# Patient Record
Sex: FEMALE | Race: WHITE
[De-identification: names, ages, dates, MRNs, and addresses within clinical notes are randomized per-mention and may not be internally consistent; named-entity substitution may affect disease eponyms.]

---

## 2017-01-19 ENCOUNTER — HOSPITAL ENCOUNTER (OUTPATIENT)
Dept: HOSPITAL 18 - NAVSJIPCSP | Age: 82
Discharge: HOME | End: 2017-01-19
Attending: INTERNAL MEDICINE
Payer: MEDICARE

## 2017-01-19 DIAGNOSIS — Z79.899: ICD-10-CM

## 2017-01-19 DIAGNOSIS — E78.5: Primary | ICD-10-CM

## 2017-01-19 DIAGNOSIS — I11.9: ICD-10-CM

## 2017-01-19 LAB
ALP SERPL-CCNC: 89 U/L (ref 40–150)
ALT SERPL W P-5'-P-CCNC: 26 U/L (ref 0–55)
ANION GAP SERPL CALC-SCNC: 12 MMOL/L (ref 10–20)
AST SERPL-CCNC: 19 U/L (ref 5–34)
BACTERIA UR QL AUTO: (no result) HPF
BASOPHILS # BLD AUTO: 0.1 THOU/UL (ref 0–0.2)
BASOPHILS NFR BLD AUTO: 1.2 % (ref 0–1)
BILIRUB SERPL-MCNC: 0.8 MG/DL (ref 0.2–1.2)
BUN SERPL-MCNC: 13 MG/DL (ref 9.8–20.1)
CALCIUM SERPL-MCNC: 9.2 MG/DL (ref 7.8–10.44)
CHLORIDE SERPL-SCNC: 107 MMOL/L (ref 98–107)
CO2 SERPL-SCNC: 26 MMOL/L (ref 23–31)
CREAT CL PREDICTED SERPL C-G-VRATE: 0 ML/MIN (ref 70–130)
EOSINOPHIL # BLD AUTO: 0.5 THOU/UL (ref 0–0.7)
EOSINOPHIL NFR BLD AUTO: 5.4 % (ref 0–10)
GLOBULIN SER CALC-MCNC: 2.4 G/DL (ref 2.4–3.5)
HCT VFR BLD CALC: 45.1 % (ref 36–47)
LDLC SERPL CALC-MCNC: 57 MG/DL
LYMPHOCYTES # BLD AUTO: 3.9 THOU/UL (ref 1.2–3.4)
LYMPHOCYTES NFR BLD AUTO: 42 % (ref 21–51)
MONOCYTES # BLD AUTO: 0.5 THOU/UL (ref 0.11–0.59)
MONOCYTES NFR BLD AUTO: 5.2 % (ref 0–10)
NEUTROPHILS # BLD AUTO: 4.3 THOU/UL (ref 1.4–6.5)
RBC # BLD AUTO: 4.94 MILL/UL (ref 4.2–5.4)
WBC # BLD AUTO: 9.3 THOU/UL (ref 4.8–10.8)
WBC UR QL AUTO: (no result) HPF (ref 0–3)

## 2017-01-19 PROCEDURE — 81003 URINALYSIS AUTO W/O SCOPE: CPT

## 2017-01-19 PROCEDURE — 80053 COMPREHEN METABOLIC PANEL: CPT

## 2017-01-19 PROCEDURE — 81015 MICROSCOPIC EXAM OF URINE: CPT

## 2017-01-19 PROCEDURE — 80061 LIPID PANEL: CPT

## 2017-01-19 PROCEDURE — 85025 COMPLETE CBC W/AUTO DIFF WBC: CPT

## 2017-01-19 PROCEDURE — 84443 ASSAY THYROID STIM HORMONE: CPT

## 2017-01-19 PROCEDURE — 36415 COLL VENOUS BLD VENIPUNCTURE: CPT

## 2017-04-20 ENCOUNTER — HOSPITAL ENCOUNTER (OUTPATIENT)
Dept: HOSPITAL 18 - NAVSJIPCSP | Age: 82
Discharge: HOME | End: 2017-04-20
Attending: INTERNAL MEDICINE
Payer: MEDICARE

## 2017-04-20 DIAGNOSIS — E78.5: Primary | ICD-10-CM

## 2017-04-20 LAB
CHD RISK SERPL-RTO: 2.7 (ref ?–4.5)
CHOLEST SERPL-MCNC: 156 MG/DL
HDLC SERPL-MCNC: 58 MG/DL
LDLC SERPL CALC-MCNC: 86 MG/DL
TRIGL SERPL-MCNC: 58 MG/DL (ref ?–150)

## 2017-04-20 PROCEDURE — 80061 LIPID PANEL: CPT

## 2017-04-20 PROCEDURE — 36415 COLL VENOUS BLD VENIPUNCTURE: CPT

## 2017-07-22 ENCOUNTER — HOSPITAL ENCOUNTER (EMERGENCY)
Dept: HOSPITAL 18 - NAV ERS | Age: 82
Discharge: HOME | End: 2017-07-22
Payer: MEDICARE
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
## 2017-07-22 DIAGNOSIS — F17.210: ICD-10-CM

## 2017-07-22 DIAGNOSIS — Z79.899: ICD-10-CM

## 2017-07-22 DIAGNOSIS — J44.9: ICD-10-CM

## 2017-07-22 DIAGNOSIS — Z79.82: ICD-10-CM

## 2017-07-22 DIAGNOSIS — I10: ICD-10-CM

## 2017-07-22 DIAGNOSIS — M54.5: Primary | ICD-10-CM

## 2017-07-22 PROCEDURE — 72100 X-RAY EXAM L-S SPINE 2/3 VWS: CPT

## 2017-07-22 NOTE — RAD
3 VIEWS LUMBOSACRAL SPINE:

 

Date:  07/22/17 

 

COMPARISON:  

None. 

 

HISTORY:  

Fall this morning with right lower back pain. 

 

FINDINGS:

Three views of the lumbosacral spine show slight wedge compression deformity of the L2 vertebral bod
y with approximately 10% height loss. This may be acute or chronic. Vertebral bodies demonstrate nor
mal alignment without subluxation. Small osteophytes are seen throughout the lumbar spine. 

 

Atherosclerotic calcifications are seen in the aorta. Surgical clips are seen in the right upper queta
drant, likely from cholecystectomy. 

 

IMPRESSION: 

1.  Mild degenerative changes of the lumbar spine. 

 

2.  There appears to be slight compression deformity of the L2 vertebral body. This may be an acute 
or chronic compression fracture. 

 

 

POS: [facility]

## 2017-07-26 ENCOUNTER — HOSPITAL ENCOUNTER (OUTPATIENT)
Dept: HOSPITAL 18 - NAVSJIPCSP | Age: 82
End: 2017-07-26
Attending: INTERNAL MEDICINE
Payer: MEDICARE

## 2017-07-26 DIAGNOSIS — F32.4: ICD-10-CM

## 2017-07-26 DIAGNOSIS — Z79.899: ICD-10-CM

## 2017-07-26 DIAGNOSIS — E78.5: Primary | ICD-10-CM

## 2017-07-26 LAB
CHD RISK SERPL-RTO: 2.1 (ref ?–4.5)
CHOLEST SERPL-MCNC: 137 MG/DL
HDLC SERPL-MCNC: 66 MG/DL
LDLC SERPL CALC-MCNC: 60 MG/DL
TRIGL SERPL-MCNC: 55 MG/DL (ref ?–150)

## 2017-07-26 PROCEDURE — 80061 LIPID PANEL: CPT

## 2017-07-26 PROCEDURE — 36415 COLL VENOUS BLD VENIPUNCTURE: CPT

## 2017-08-06 ENCOUNTER — HOSPITAL ENCOUNTER (EMERGENCY)
Dept: HOSPITAL 18 - NAV ERS | Age: 82
Discharge: HOME | End: 2017-08-06
Payer: MEDICARE

## 2017-08-06 DIAGNOSIS — Z79.899: ICD-10-CM

## 2017-08-06 DIAGNOSIS — M25.562: Primary | ICD-10-CM

## 2017-08-06 DIAGNOSIS — F17.210: ICD-10-CM

## 2017-08-06 DIAGNOSIS — J44.9: ICD-10-CM

## 2017-08-06 DIAGNOSIS — Z79.82: ICD-10-CM

## 2017-08-06 DIAGNOSIS — I10: ICD-10-CM

## 2017-08-06 NOTE — RAD
LEFT KNEE 4 VIEWS:

 

Date:  08/06/17 

 

HISTORY:  

Left knee pain. 

 

FINDINGS/IMPRESSION: 

Mild degenerative changes are present. No fracture, dislocation, or bony destruction identified. 

 

 

POS: [facility]

## 2017-10-25 ENCOUNTER — HOSPITAL ENCOUNTER (OUTPATIENT)
Dept: HOSPITAL 92 - LABBT | Age: 82
Discharge: HOME | End: 2017-10-25
Attending: ORTHOPAEDIC SURGERY
Payer: MEDICARE

## 2017-10-25 DIAGNOSIS — G56.01: ICD-10-CM

## 2017-10-25 DIAGNOSIS — Z01.818: Primary | ICD-10-CM

## 2017-10-25 LAB
ANION GAP SERPL CALC-SCNC: 12 MMOL/L (ref 10–20)
BASOPHILS # BLD AUTO: 0.1 THOU/UL (ref 0–0.2)
BASOPHILS NFR BLD AUTO: 0.7 % (ref 0–1)
BUN SERPL-MCNC: 13 MG/DL (ref 9.8–20.1)
CALCIUM SERPL-MCNC: 9.5 MG/DL (ref 7.8–10.44)
CHLORIDE SERPL-SCNC: 105 MMOL/L (ref 98–107)
CO2 SERPL-SCNC: 27 MMOL/L (ref 23–31)
CREAT CL PREDICTED SERPL C-G-VRATE: 0 ML/MIN (ref 70–130)
EOSINOPHIL # BLD AUTO: 0.3 THOU/UL (ref 0–0.7)
EOSINOPHIL NFR BLD AUTO: 3.4 % (ref 0–10)
HCT VFR BLD CALC: 46.9 % (ref 36–47)
HYALINE CASTS #/AREA URNS LPF: (no result) LPF
LYMPHOCYTES # BLD: 2.1 THOU/UL (ref 1.2–3.4)
LYMPHOCYTES NFR BLD AUTO: 22 % (ref 21–51)
MONOCYTES # BLD AUTO: 0.5 THOU/UL (ref 0.11–0.59)
MONOCYTES NFR BLD AUTO: 5.5 % (ref 0–10)
NEUTROPHILS # BLD AUTO: 6.5 THOU/UL (ref 1.4–6.5)
RBC # BLD AUTO: 5.11 MILL/UL (ref 4.2–5.4)
RBC UR QL AUTO: (no result) HPF (ref 0–3)
WBC # BLD AUTO: 9.5 THOU/UL (ref 4.8–10.8)
WBC UR QL AUTO: (no result) HPF (ref 0–3)

## 2017-10-25 PROCEDURE — 93005 ELECTROCARDIOGRAM TRACING: CPT

## 2017-10-25 PROCEDURE — 93010 ELECTROCARDIOGRAM REPORT: CPT

## 2017-10-25 PROCEDURE — 85025 COMPLETE CBC W/AUTO DIFF WBC: CPT

## 2017-10-25 PROCEDURE — 81001 URINALYSIS AUTO W/SCOPE: CPT

## 2017-10-25 PROCEDURE — 80048 BASIC METABOLIC PNL TOTAL CA: CPT

## 2017-10-25 NOTE — EKG
Test Reason : 

Blood Pressure : ***/*** mmHG

Vent. Rate : 064 BPM     Atrial Rate : 064 BPM

   P-R Int : 114 ms          QRS Dur : 080 ms

    QT Int : 426 ms       P-R-T Axes : 032 070 057 degrees

   QTc Int : 439 ms

 

Normal sinus rhythm

Anterior infarct , age undetermined

Abnormal ECG

No previous ECGs available

Confirmed by DR. DICK ABRAHAM (3) on 10/25/2017 1:26:52 PM

 

Referred By:  FLACA           Confirmed By:DR. DICK ABRAHAM

## 2017-10-27 ENCOUNTER — HOSPITAL ENCOUNTER (OUTPATIENT)
Dept: HOSPITAL 92 - SDC | Age: 82
Discharge: HOME | End: 2017-10-27
Attending: ANESTHESIOLOGY
Payer: MEDICARE

## 2017-10-27 VITALS — BODY MASS INDEX: 21.7 KG/M2

## 2017-10-27 DIAGNOSIS — E78.5: ICD-10-CM

## 2017-10-27 DIAGNOSIS — J44.9: ICD-10-CM

## 2017-10-27 DIAGNOSIS — I50.9: ICD-10-CM

## 2017-10-27 DIAGNOSIS — I11.0: ICD-10-CM

## 2017-10-27 DIAGNOSIS — G56.01: Primary | ICD-10-CM

## 2017-10-27 DIAGNOSIS — Z87.891: ICD-10-CM

## 2017-10-27 DIAGNOSIS — E03.9: ICD-10-CM

## 2017-10-27 PROCEDURE — 01N50ZZ RELEASE MEDIAN NERVE, OPEN APPROACH: ICD-10-PCS | Performed by: ORTHOPAEDIC SURGERY

## 2017-10-27 PROCEDURE — 96374 THER/PROPH/DIAG INJ IV PUSH: CPT

## 2017-10-27 NOTE — OP
DATE OF PROCEDURE:  10/27/2017

 

PREOPERATIVE DIAGNOSIS:  Right carpal tunnel syndrome.

 

POSTOPERATIVE DIAGNOSIS:  Right carpal tunnel syndrome.  t

 

FINDINGS:  

1.  Tight transverse carpal ligament in the central proximal one-third with some early flattening, b
ut no true hourglass formation.  

2.  No marked tenosynovitis seen.

3.  Type 1 takeoff motor branch.

 

PROCEDURE PERFORMED:  

1.  Carpal tunnel release, right median nerve neuroplasty of right wrist.  

2.  Application, intraoperative steroids. 

 

COMPLICATIONS:  None.

 

TOURNIQUET TIME:  14 minutes at 250 mmHg pressure.

 

ANESTHESIA:  [employer] Anesthesia, general LMA technique augmented by 8 mL 0.5% Marcaine block incisi
on.  She has failed conservative treatment and has had the problem documented in the evaluation as w
ell as elective diagnostically.

 

DESCRIPTION OF PROCEDURE:  After successful anesthesia listed above, the limb was prepped and draped
.  The patient had timeout done appropriately.  The limb was exsanguinated, tourniquet inflated to 2
50 mmHg pressure.  We then gave the 8 mL Marcaine block along the incision outlined beginning in pedro
e with the ring finger, central area from medial, lateral, and as far distal as _____ 4 proximal and
 6 mm distal to volar wrist flexion crease.

 

We then carried this incision to skin and subcutaneous tissue, staying just slightly ulnar to the li
ne of the palmaris longus until we reached the transverse carpal ligament.  Blunt dissection exposed
, a Weitlaner retractor was placed and then we began to _____ the transcarpal ligament from the mid 
portion distally visualizing and avoiding the distal neurovascular bundle that included the division
 of the nerves.  Here we saw the motor branch and it was intact without separate constriction.

 

We then under direct visualization, released the transcarpal ligament from the mid portion proximall
y.  Then, this was 100% released all the way to the volar flexion, the volar wrist fascia as well.  
The tendons were inspected, there was no excessive tenosynovitis, we then visualized the nerve and f
ound the flattening as described above.

 

Tourniquet released, hemostasis obtained.  Two mL of Celestone drip along the nerve and then wound w
as closed with interrupted 4-0 nylon in a mattress pattern.  Bulky dressing was applied.  The patien
t left operating room without anesthetic or operative complication.

## 2018-05-21 ENCOUNTER — APPOINTMENT (RX ONLY)
Dept: URBAN - METROPOLITAN AREA CLINIC 91 | Facility: CLINIC | Age: 83
Setting detail: DERMATOLOGY
End: 2018-05-21

## 2018-05-21 PROBLEM — I10 ESSENTIAL (PRIMARY) HYPERTENSION: Status: ACTIVE | Noted: 2018-05-21

## 2018-05-21 PROBLEM — J44.9 CHRONIC OBSTRUCTIVE PULMONARY DISEASE, UNSPECIFIED: Status: ACTIVE | Noted: 2018-05-21

## 2018-05-21 PROBLEM — C44.629 SQUAMOUS CELL CARCINOMA OF SKIN OF LEFT UPPER LIMB, INCLUDING SHOULDER: Status: ACTIVE | Noted: 2018-05-21

## 2018-05-21 PROBLEM — L23.7 ALLERGIC CONTACT DERMATITIS DUE TO PLANTS, EXCEPT FOOD: Status: ACTIVE | Noted: 2018-05-21

## 2018-05-21 PROBLEM — E03.9 HYPOTHYROIDISM, UNSPECIFIED: Status: ACTIVE | Noted: 2018-05-21

## 2018-05-21 PROCEDURE — ? COUNSELING

## 2018-05-21 PROCEDURE — ? REFERRAL

## 2018-05-21 PROCEDURE — 99213 OFFICE O/P EST LOW 20 MIN: CPT

## 2018-06-08 ENCOUNTER — RX ONLY (OUTPATIENT)
Age: 83
Setting detail: RX ONLY
End: 2018-06-08

## 2018-06-08 ENCOUNTER — APPOINTMENT (RX ONLY)
Dept: URBAN - METROPOLITAN AREA CLINIC 91 | Facility: CLINIC | Age: 83
Setting detail: DERMATOLOGY
End: 2018-06-08

## 2018-06-08 VITALS
SYSTOLIC BLOOD PRESSURE: 144 MMHG | HEIGHT: 63 IN | HEART RATE: 72 BPM | WEIGHT: 127 LBS | DIASTOLIC BLOOD PRESSURE: 67 MMHG

## 2018-06-08 DIAGNOSIS — D485 NEOPLASM OF UNCERTAIN BEHAVIOR OF SKIN: ICD-10-CM

## 2018-06-08 PROBLEM — D48.5 NEOPLASM OF UNCERTAIN BEHAVIOR OF SKIN: Status: ACTIVE | Noted: 2018-06-08

## 2018-06-08 PROBLEM — C44.629 SQUAMOUS CELL CARCINOMA OF SKIN OF LEFT UPPER LIMB, INCLUDING SHOULDER: Status: ACTIVE | Noted: 2018-06-08

## 2018-06-08 PROCEDURE — 99213 OFFICE O/P EST LOW 20 MIN: CPT | Mod: 25

## 2018-06-08 PROCEDURE — 11100: CPT | Mod: 59

## 2018-06-08 PROCEDURE — ? BIOPSY BY SHAVE METHOD

## 2018-06-08 PROCEDURE — ? CONSULTATION FOR MOHS SURGERY

## 2018-06-08 PROCEDURE — ? MOHS SURGERY

## 2018-06-08 PROCEDURE — 17314 MOHS ADDL STAGE T/A/L: CPT

## 2018-06-08 PROCEDURE — 17313 MOHS 1 STAGE T/A/L: CPT

## 2018-06-08 PROCEDURE — 13121 CMPLX RPR S/A/L 2.6-7.5 CM: CPT

## 2018-06-08 RX ORDER — MUPIROCIN 20 MG/G
OINTMENT TOPICAL
Qty: 1 | Refills: 3 | Status: ERX

## 2018-06-08 RX ORDER — MUPIROCIN 20 MG/G
APPLY OINTMENT TOPICAL
Qty: 1 | Refills: 3 | Status: ERX | COMMUNITY
Start: 2018-06-08

## 2018-06-08 ASSESSMENT — LOCATION SIMPLE DESCRIPTION DERM: LOCATION SIMPLE: LEFT FOREARM

## 2018-06-08 ASSESSMENT — LOCATION DETAILED DESCRIPTION DERM: LOCATION DETAILED: LEFT DISTAL DORSAL FOREARM

## 2018-06-08 ASSESSMENT — LOCATION ZONE DERM: LOCATION ZONE: ARM

## 2018-06-08 NOTE — PROCEDURE: BIOPSY BY SHAVE METHOD
Bill For Surgical Tray: no
Was A Bandage Applied: Yes
Hemostasis: Electrocautery
Lab Facility: [id number]
Wound Care: No ointment
Consent: Written consent was obtained and risks were reviewed including but not limited to scarring, infection, bleeding, scabbing, incomplete removal, nerve damage and allergy to anesthesia.
X Size Of Lesion In Cm: 2
Curettage Text: The wound bed was treated with curettage after the biopsy was performed.
Billing Type: Third-Party Bill
Anesthesia Type: 1% lidocaine with epinephrine
Anesthesia Volume In Cc (Will Not Render If 0): 6
Dressing: no dressing applied
Additional Anesthesia Volume In Cc (Will Not Render If 0): 0
Cryotherapy Text: The wound bed was treated with cryotherapy after the biopsy was performed.
Electrodesiccation Text: The wound bed was treated with electrodesiccation after the biopsy was performed.
Type Of Destruction Used: Curettage
Post-Care Instructions: I reviewed with the patient in detail post-care instructions. Patient is to keep the biopsy site dry overnight, and then apply bacitracin twice daily until healed. Patient may apply hydrogen peroxide soaks to remove any crusting.
Detail Level: Detailed
Electrodesiccation And Curettage Text: The wound bed was treated with electrodesiccation and curettage after the biopsy was performed.
Biopsy Type: H and E
Lab: [id number]
Notification Instructions: Patient will be notified of biopsy results.
Biopsy Method: Dermablade
Silver Nitrate Text: The wound bed was treated with silver nitrate after the biopsy was performed.

## 2018-06-08 NOTE — PROCEDURE: CONSULTATION FOR MOHS SURGERY
Body Location Override (Optional - Billing Will Still Be Based On Selected Body Map Location If Applicable): left distal forearm
Location Indication Override (Is Already Calculated Based On Selected Body Location): Area L
Detail Level: Detailed
Name Of The Referring Provider For Procedure: SIVA Manuel
Size Of Lesion: -
Incorporate Mauc In Note: Yes
X Size Of Lesion In Cm (Optional): 0

## 2018-06-08 NOTE — HPI: MOHS SURGERY CONSULTATION
Has The Cancer Been Biopsied Before?: has not been previously biopsied
Who Is Your Referring Provider?: SIVA Manuel
Body Location Override (Optional): Left distal forearm

## 2018-06-08 NOTE — PROCEDURE: MOHS SURGERY
Rotation Flap Text: The defect edges were debeveled with a #15 scalpel blade.  Given the location of the defect, shape of the defect and the proximity to free margins a rotation flap was deemed most appropriate.  Using a sterile surgical marker, an appropriate rotation flap was drawn incorporating the defect and placing the expected incisions within the relaxed skin tension lines where possible.    The area thus outlined was incised deep to adipose tissue with a #15 scalpel blade.  The skin margins were undermined to an appropriate distance in all directions utilizing iris scissors.
Stage 5: Additional Anesthesia Volume In Cc: 0
Transposition Flap Text: The defect edges were debeveled with a #15 scalpel blade.  Given the location of the defect and the proximity to free margins a transposition flap was deemed most appropriate.  Using a sterile surgical marker, an appropriate transposition flap was drawn incorporating the defect.    The area thus outlined was incised deep to adipose tissue with a #15 scalpel blade.  The skin margins were undermined to an appropriate distance in all directions utilizing iris scissors.
Consent (Spinal Accessory)/Introductory Paragraph: The rationale for Mohs was explained to the patient and consent was obtained. The risks, benefits and alternatives to therapy were discussed in detail. Specifically, the risks of damage to the spinal accessory nerve, infection, scarring, bleeding, prolonged wound healing, incomplete removal, allergy to anesthesia, and recurrence were addressed. Prior to the procedure, the treatment site was clearly identified and confirmed by the patient. All components of Universal Protocol/PAUSE Rule completed.
O-T Plasty Text: The defect edges were debeveled with a #15 scalpel blade.  Given the location of the defect, shape of the defect and the proximity to free margins an O-T plasty was deemed most appropriate.  Using a sterile surgical marker, an appropriate O-T plasty was drawn incorporating the defect and placing the expected incisions within the relaxed skin tension lines where possible.    The area thus outlined was incised deep to adipose tissue with a #15 scalpel blade.  The skin margins were undermined to an appropriate distance in all directions utilizing iris scissors.
Number Of Stages: 2
Interpolation Flap Text: A decision was made to reconstruct the defect utilizing an interpolation axial flap and a staged reconstruction.  A telfa template was made of the defect.  This telfa template was then used to outline the interpolation flap.  The donor area for the pedicle flap was then injected with anesthesia.  The flap was excised through the skin and subcutaneous tissue down to the layer of the underlying musculature.  The interpolation flap was carefully excised within this deep plane to maintain its blood supply.  The edges of the donor site were undermined.   The donor site was closed in a primary fashion.  The pedicle was then rotated into position and sutured.  Once the tube was sutured into place, adequate blood supply was confirmed with blanching and refill.  The pedicle was then wrapped with xeroform gauze and dressed appropriately with a telfa and gauze bandage to ensure continued blood supply and protect the attached pedicle.
Localized Dermabrasion Text: The patient was draped in routine manner.  Localized dermabrasion using 3 x 17 mm wire brush was performed in routine manner to papillary dermis. This spot dermabrasion is being performed to complete skin cancer reconstruction. It also will eliminate the other sun damaged precancerous cells that are known to be part of the regional effect of a lifetime's worth of sun exposure. This localized dermabrasion is therapeutic and should not be considered cosmetic in any regard.
Closure 2 Information: This tab is for additional flaps and grafts, including complex repair and grafts and complex repair and flaps. You can also specify a different location for the additional defect, if the location is the same you do not need to select a new one. We will insert the automated text for the repair you select below just as we do for solitary flaps and grafts. Please note that at this time if you select a location with a different insurance zone you will need to override the ICD10 and CPT if appropriate.
Wound Care: Polysporin ointment
W Plasty Text: The lesion was extirpated to the level of the fat with a #15 scalpel blade.  Given the location of the defect, shape of the defect and the proximity to free margins a W-plasty was deemed most appropriate for repair.  Using a sterile surgical marker, the appropriate transposition arms of the W-plasty were drawn incorporating the defect and placing the expected incisions within the relaxed skin tension lines where possible.    The area thus outlined was incised deep to adipose tissue with a #15 scalpel blade.  The skin margins were undermined to an appropriate distance in all directions utilizing iris scissors.  The opposing transposition arms were then transposed into place in opposite direction and anchored with interrupted buried subcutaneous sutures.
Use Separate Skin Prep For Stages And Repair: Yes
Lazy S Complex Repair Preamble Text (Leave Blank If You Do Not Want): Extensive wide undermining was performed.
Posterior Auricular Interpolation Flap Text: A decision was made to reconstruct the defect utilizing an interpolation axial flap and a staged reconstruction.  A telfa template was made of the defect.  This telfa template was then used to outline the posterior auricular interpolation flap.  The donor area for the pedicle flap was then injected with anesthesia.  The flap was excised through the skin and subcutaneous tissue down to the layer of the underlying musculature.  The pedicle flap was carefully excised within this deep plane to maintain its blood supply.  The edges of the donor site were undermined.   The donor site was closed in a primary fashion.  The pedicle was then rotated into position and sutured.  Once the tube was sutured into place, adequate blood supply was confirmed with blanching and refill.  The pedicle was then wrapped with xeroform gauze and dressed appropriately with a telfa and gauze bandage to ensure continued blood supply and protect the attached pedicle.
Subsequent Stages Histo Method Verbiage: Using a similar technique to that described above, a thin layer of tissue was removed from all areas where tumor was visible on the previous stage.  The tissue was again oriented, mapped, dyed, and processed as above.
Hemostasis: Electrocautery
Quadrant Reporting?: no
V-Y Flap Text: The defect edges were debeveled with a #15 scalpel blade.  Given the location of the defect, shape of the defect and the proximity to free margins a V-Y flap was deemed most appropriate.  Using a sterile surgical marker, an appropriate advancement flap was drawn incorporating the defect and placing the expected incisions within the relaxed skin tension lines where possible.    The area thus outlined was incised deep to adipose tissue with a #15 scalpel blade.  The skin margins were undermined to an appropriate distance in all directions utilizing iris scissors.
Crescentic Intermediate Repair Preamble Text (Leave Blank If You Do Not Want): Undermining was performed with blunt dissection.
Referred To Mid-Level For Closure Text (Leave Blank If You Do Not Want): After obtaining clear surgical margins the patient was sent to a mid-level provider for surgical repair.  The patient understands they will receive post-surgical care and follow-up from the mid-level provider.
Cheiloplasty (Less Than 50%) Text: A decision was made to reconstruct the defect with a  cheiloplasty.  The defect was undermined extensively.  Additional obicularis oris muscle was excised with a 15 blade scalpel.  The defect was converted into a full thickness wedge, of less than 50% of the vertical height of the lip, to facilite a better cosmetic result.  Small vessels were then tied off with 5-0 monocyrl. The obicularis oris, superficial fascia, adipose and dermis were then reapproximated.  After the deeper layers were approximated the epidermis was reapproximated with particular care given to realign the vermilion border.
Burow's Advancement Flap Text: The defect edges were debeveled with a #15 scalpel blade.  Given the location of the defect and the proximity to free margins a Burow's advancement flap was deemed most appropriate.  Using a sterile surgical marker, the appropriate advancement flap was drawn incorporating the defect and placing the expected incisions within the relaxed skin tension lines where possible.    The area thus outlined was incised deep to adipose tissue with a #15 scalpel blade.  The skin margins were undermined to an appropriate distance in all directions utilizing iris scissors.
Modified Advancement Flap Text: The defect edges were debeveled with a #15 scalpel blade.  Given the location of the defect, shape of the defect and the proximity to free margins a modified advancement flap was deemed most appropriate.  Using a sterile surgical marker, an appropriate advancement flap was drawn incorporating the defect and placing the expected incisions within the relaxed skin tension lines where possible.    The area thus outlined was incised deep to adipose tissue with a #15 scalpel blade.  The skin margins were undermined to an appropriate distance in all directions utilizing iris scissors.
Island Pedicle Flap With Canthal Suspension Text: The defect edges were debeveled with a #15 scalpel blade.  Given the location of the defect, shape of the defect and the proximity to free margins an island pedicle advancement flap was deemed most appropriate.  Using a sterile surgical marker, an appropriate advancement flap was drawn incorporating the defect, outlining the appropriate donor tissue and placing the expected incisions within the relaxed skin tension lines where possible. The area thus outlined was incised deep to adipose tissue with a #15 scalpel blade.  The skin margins were undermined to an appropriate distance in all directions around the primary defect and laterally outward around the island pedicle utilizing iris scissors.  There was minimal undermining beneath the pedicle flap. A suspension suture was placed in the canthal tendon to prevent tension and prevent ectropion.
Simple / Intermediate / Complex Repair - Final Wound Length In Cm: 5
Stage 7: Additional Anesthesia Type: 1% lidocaine with epinephrine
Dermal Autograft Text: The defect edges were debeveled with a #15 scalpel blade.  Given the location of the defect, shape of the defect and the proximity to free margins a dermal autograft was deemed most appropriate.  Using a sterile surgical marker, the primary defect shape was transferred to the donor site. The area thus outlined was incised deep to adipose tissue with a #15 scalpel blade.  The harvested graft was then trimmed of adipose and epidermal tissue until only dermis was left.  The skin graft was then placed in the primary defect and oriented appropriately.
Epidermal Closure Graft Donor Site (Optional): simple interrupted
Date Of Previous Biopsy (Optional): 06/08/2018
Bilobed Flap Text: The defect edges were debeveled with a #15 scalpel blade.  Given the location of the defect and the proximity to free margins a bilobe flap was deemed most appropriate.  Using a sterile surgical marker, an appropriate bilobe flap drawn around the defect.    The area thus outlined was incised deep to adipose tissue with a #15 scalpel blade.  The skin margins were undermined to an appropriate distance in all directions utilizing iris scissors.
Dressing (No Sutures): dry sterile dressing
Referring Physician (Optional): SIVA Manuel
Medical Necessity Statement: Based on my medical judgement, Mohs surgery is the most appropriate treatment for this cancer compared to other treatments.
Consent (Scalp)/Introductory Paragraph: The rationale for Mohs was explained to the patient and consent was obtained. The risks, benefits and alternatives to therapy were discussed in detail. Specifically, the risks of changes in hair growth pattern secondary to repair, infection, scarring, bleeding, prolonged wound healing, incomplete removal, allergy to anesthesia, nerve injury and recurrence were addressed. Prior to the procedure, the treatment site was clearly identified and confirmed by the patient. All components of Universal Protocol/PAUSE Rule completed.
Bcc Infiltrative Histology Text: There were numerous aggregates of basaloid cells demonstrating an infiltrative pattern.
Post-Care Instructions: I reviewed with the patient in detail post-care instructions. Patient is not to engage in any heavy lifting, exercise, or swimming for the next 14 days. Should the patient develop any fevers, chills, bleeding, severe pain patient will contact the office immediately.
Secondary Intention Text (Leave Blank If You Do Not Want): The defect will heal with secondary intention.
Estimated Blood Loss (Cc): minimal
Manual Repair Warning Statement: We plan on removing the manually selected variable below in favor of our much easier automatic structured text blocks found in the previous tab. We decided to do this to help make the flow better and give you the full power of structured data. Manual selection is never going to be ideal in our platform and I would encourage you to avoid using manual selection from this point on, especially since I will be sunsetting this feature. It is important that you do one of two things with the customized text below. First, you can save all of the text in a word file so you can have it for future reference. Second, transfer the text to the appropriate area in the Library tab. Lastly, if there is a flap or graft type which we do not have you need to let us know right away so I can add it in before the variable is hidden. No need to panic, we plan to give you roughly 6 months to make the change.
Detail Level: Detailed
Helical Rim Advancement Flap Text: The defect edges were debeveled with a #15 blade scalpel.  Given the location of the defect and the proximity to free margins (helical rim) a double helical rim advancement flap was deemed most appropriate.  Using a sterile surgical marker, the appropriate advancement flaps were drawn incorporating the defect and placing the expected incisions between the helical rim and antihelix where possible.  The area thus outlined was incised through and through with a #15 scalpel blade.  With a skin hook and iris scissors, the flaps were gently and sharply undermined and freed up.
Advancement Flap (Single) Text: The defect edges were debeveled with a #15 scalpel blade.  Given the location of the defect and the proximity to free margins a single advancement flap was deemed most appropriate.  Using a sterile surgical marker, an appropriate advancement flap was drawn incorporating the defect and placing the expected incisions within the relaxed skin tension lines where possible.    The area thus outlined was incised deep to adipose tissue with a #15 scalpel blade.  The skin margins were undermined to an appropriate distance in all directions utilizing iris scissors.
Graft Donor Site Bandage (Optional-Leave Blank If You Don't Want In Note): Steri-strips and a pressure bandage were applied to the donor site.
Area L Indication Text: Tumors in this location are included in Area L (trunk and extremities).  Mohs surgery is indicated for larger tumors, or tumors with aggressive histologic features, in these anatomic locations.
Cheek Interpolation Flap Text: A decision was made to reconstruct the defect utilizing an interpolation axial flap and a staged reconstruction.  A telfa template was made of the defect.  This telfa template was then used to outline the Cheek Interpolation flap.  The donor area for the pedicle flap was then injected with anesthesia.  The flap was excised through the skin and subcutaneous tissue down to the layer of the underlying musculature.  The interpolation flap was carefully excised within this deep plane to maintain its blood supply.  The edges of the donor site were undermined.   The donor site was closed in a primary fashion.  The pedicle was then rotated into position and sutured.  Once the tube was sutured into place, adequate blood supply was confirmed with blanching and refill.  The pedicle was then wrapped with xeroform gauze and dressed appropriately with a telfa and gauze bandage to ensure continued blood supply and protect the attached pedicle.
Location Indication Override (Is Already Calculated Based On Selected Body Location): Area L
Ftsg Text: The defect edges were debeveled with a #15 scalpel blade.  Given the location of the defect, shape of the defect and the proximity to free margins a full thickness skin graft was deemed most appropriate.  Using a sterile surgical marker, the primary defect shape was transferred to the donor site. The area thus outlined was incised deep to adipose tissue with a #15 scalpel blade.  The harvested graft was then trimmed of adipose tissue until only dermis and epidermis was left.  The skin margins of the secondary defect were undermined to an appropriate distance in all directions utilizing iris scissors.  The secondary defect was closed with interrupted buried subcutaneous sutures.  The skin edges were then re-apposed with running  sutures.  The skin graft was then placed in the primary defect and oriented appropriately.
Partial Purse String (Intermediate) Text: Given the location of the defect and the characteristics of the surrounding skin an intermediate purse string closure was deemed most appropriate.  Undermining was performed circumfirentially around the surgical defect.  A purse string suture was then placed and tightened. Wound tension only allowed a partial closure of the circular defect.
No Residual Tumor Seen Histology Text: There were no malignant cells seen in the sections examined.
Repair Anesthesia Method: local infiltration
Repair Performed By Another Provider Text (Leave Blank If You Do Not Want): After obtaining clear surgical margins the defect was repaired by another provider.
Trilobed Flap Text: The defect edges were debeveled with a #15 scalpel blade.  Given the location of the defect and the proximity to free margins a trilobed flap was deemed most appropriate.  Using a sterile surgical marker, an appropriate trilobed flap drawn around the defect.    The area thus outlined was incised deep to adipose tissue with a #15 scalpel blade.  The skin margins were undermined to an appropriate distance in all directions utilizing iris scissors.
Consent (Marginal Mandibular)/Introductory Paragraph: The rationale for Mohs was explained to the patient and consent was obtained. The risks, benefits and alternatives to therapy were discussed in detail. Specifically, the risks of damage to the marginal mandibular branch of the facial nerve, infection, scarring, bleeding, prolonged wound healing, incomplete removal, allergy to anesthesia, and recurrence were addressed. Prior to the procedure, the treatment site was clearly identified and confirmed by the patient. All components of Universal Protocol/PAUSE Rule completed.
Composite Graft Text: The defect edges were debeveled with a #15 scalpel blade.  Given the location of the defect, shape of the defect, the proximity to free margins and the fact the defect was full thickness a composite graft was deemed most appropriate.  The defect was outline and then transferred to the donor site.  A full thickness graft was then excised from the donor site. The graft was then placed in the primary defect, oriented appropriately and then sutured into place.  The secondary defect was then repaired using a primary closure.
Melolabial Interpolation Flap Text: A decision was made to reconstruct the defect utilizing an interpolation axial flap and a staged reconstruction.  A telfa template was made of the defect.  This telfa template was then used to outline the melolabial interpolation flap.  The donor area for the pedicle flap was then injected with anesthesia.  The flap was excised through the skin and subcutaneous tissue down to the layer of the underlying musculature.  The pedicle flap was carefully excised within this deep plane to maintain its blood supply.  The edges of the donor site were undermined.   The donor site was closed in a primary fashion.  The pedicle was then rotated into position and sutured.  Once the tube was sutured into place, adequate blood supply was confirmed with blanching and refill.  The pedicle was then wrapped with xeroform gauze and dressed appropriately with a telfa and gauze bandage to ensure continued blood supply and protect the attached pedicle.
Full Thickness Lip Wedge Repair (Flap) Text: Given the location of the defect and the proximity to free margins a full thickness wedge repair was deemed most appropriate.  Using a sterile surgical marker, the appropriate repair was drawn incorporating the defect and placing the expected incisions perpendicular to the vermilion border.  The vermilion border was also meticulously outlined to ensure appropriate reapproximation during the repair.  The area thus outlined was incised through and through with a #15 scalpel blade.  The muscularis and dermis were reaproximated with deep sutures following hemostasis. Care was taken to realign the vermilion border before proceeding with the superficial closure.  Once the vermilion was realigned the superfical and mucosal closure was finished.
Consent (Lip)/Introductory Paragraph: The rationale for Mohs was explained to the patient and consent was obtained. The risks, benefits and alternatives to therapy were discussed in detail. Specifically, the risks of lip deformity, changes in the oral aperture, infection, scarring, bleeding, prolonged wound healing, incomplete removal, allergy to anesthesia, nerve injury and recurrence were addressed. Prior to the procedure, the treatment site was clearly identified and confirmed by the patient. All components of Universal Protocol/PAUSE Rule completed.
Suture Removal: 14 days
Referred To Plastics For Closure Text (Leave Blank If You Do Not Want): After obtaining clear surgical margins the patient was sent to plastics for surgical repair.  The patient understands they will receive post-surgical care and follow-up from the referring physician's office.
Xenograft Text: The defect edges were debeveled with a #15 scalpel blade.  Given the location of the defect, shape of the defect and the proximity to free margins a xenograft was deemed most appropriate.  The graft was then trimmed to fit the size of the defect.  The graft was then placed in the primary defect and oriented appropriately.
Keystone Flap Text: The defect edges were debeveled with a #15 scalpel blade.  Given the location of the defect, shape of the defect a keystone flap was deemed most appropriate.  Using a sterile surgical marker, an appropriate keystone flap was drawn incorporating the defect, outlining the appropriate donor tissue and placing the expected incisions within the relaxed skin tension lines where possible. The area thus outlined was incised deep to adipose tissue with a #15 scalpel blade.  The skin margins were undermined to an appropriate distance in all directions around the primary defect and laterally outward around the flap utilizing iris scissors.
Referred To Oculoplastics For Closure Text (Leave Blank If You Do Not Want): After obtaining clear surgical margins the patient was sent to oculoplastics for surgical repair.  The patient understands they will receive post-surgical care and follow-up from the referring physician's office.
Consent 2/Introductory Paragraph: Mohs surgery was explained to the patient and consent was obtained. The risks, benefits and alternatives to therapy were discussed in detail. Specifically, the risks of infection, scarring, bleeding, prolonged wound healing, incomplete removal, allergy to anesthesia, nerve injury and recurrence were addressed. Prior to the procedure, the treatment site was clearly identified and confirmed by the patient. All components of Universal Protocol/PAUSE Rule completed.
Area H Indication Text: Tumors in this location are included in Area H (eyelids, eyebrows, nose, lips, chin, ear, pre-auricular, post-auricular, temple, genitalia, hands, feet, ankles and areola).  Tissue conservation is critical in these anatomic locations.
Complex Repair And Flap Additional Text (Will Appearing After The Standard Complex Repair Text): The complex repair was not sufficient to completely close the primary defect. The remaining additional defect was repaired with the flap mentioned below.
Muscle Hinge Flap Text: The defect edges were debeveled with a #15 scalpel blade.  Given the size, depth and location of the defect and the proximity to free margins a muscle hinge flap was deemed most appropriate.  Using a sterile surgical marker, an appropriate hinge flap was drawn incorporating the defect. The area thus outlined was incised with a #15 scalpel blade.  The skin margins were undermined to an appropriate distance in all directions utilizing iris scissors.
Anesthesia Volume In Cc: 6
Rhombic Flap Text: The defect edges were debeveled with a #15 scalpel blade.  Given the location of the defect and the proximity to free margins a rhombic flap was deemed most appropriate.  Using a sterile surgical marker, an appropriate rhombic flap was drawn incorporating the defect.    The area thus outlined was incised deep to adipose tissue with a #15 scalpel blade.  The skin margins were undermined to an appropriate distance in all directions utilizing iris scissors.
S Plasty Text: Given the location and shape of the defect, and the orientation of relaxed skin tension lines, an S-plasty was deemed most appropriate for repair.  Using a sterile surgical marker, the appropriate outline of the S-plasty was drawn, incorporating the defect and placing the expected incisions within the relaxed skin tension lines where possible.  The area thus outlined was incised deep to adipose tissue with a #15 scalpel blade.  The skin margins were undermined to an appropriate distance in all directions utilizing iris scissors. The skin flaps were advanced over the defect.  The opposing margins were then approximated with interrupted buried subcutaneous sutures.
Consent (Ear)/Introductory Paragraph: The rationale for Mohs was explained to the patient and consent was obtained. The risks, benefits and alternatives to therapy were discussed in detail. Specifically, the risks of ear deformity, infection, scarring, bleeding, prolonged wound healing, incomplete removal, allergy to anesthesia, nerve injury and recurrence were addressed. Prior to the procedure, the treatment site was clearly identified and confirmed by the patient. All components of Universal Protocol/PAUSE Rule completed.
Purse String (Intermediate) Text: Given the location of the defect and the characteristics of the surrounding skin a purse string intermediate closure was deemed most appropriate.  Undermining was performed circumfirentially around the surgical defect.  A purse string suture was then placed and tightened.
Eye Protection Verbiage: Before proceeding with the stage, a plastic scleral shield was inserted. The globe was anesthetized with a few drops of 1% lidocaine with 1:100,000 epinephrine. Then, an appropriate sized scleral shield was chosen and coated with lacrilube ointment. The shield was gently inserted and left in place for the duration of each stage. After the stage was completed, the shield was gently removed.
Primary Defect Width In Cm (Final Defect Size - Required For Flaps/Grafts): 2.5
Cheiloplasty (Complex) Text: A decision was made to reconstruct the defect with a  cheiloplasty.  The defect was undermined extensively.  Additional obicularis oris muscle was excised with a 15 blade scalpel.  The defect was converted into a full thickness wedge to facilite a better cosmetic result.  Small vessels were then tied off with 5-0 monocyrl. The obicularis oris, superficial fascia, adipose and dermis were then reapproximated.  After the deeper layers were approximated the epidermis was reapproximated with particular care given to realign the vermilion border.
Double Island Pedicle Flap Text: The defect edges were debeveled with a #15 scalpel blade.  Given the location of the defect, shape of the defect and the proximity to free margins a double island pedicle advancement flap was deemed most appropriate.  Using a sterile surgical marker, an appropriate advancement flap was drawn incorporating the defect, outlining the appropriate donor tissue and placing the expected incisions within the relaxed skin tension lines where possible.    The area thus outlined was incised deep to adipose tissue with a #15 scalpel blade.  The skin margins were undermined to an appropriate distance in all directions around the primary defect and laterally outward around the island pedicle utilizing iris scissors.  There was minimal undermining beneath the pedicle flap.
A-T Advancement Flap Text: The defect edges were debeveled with a #15 scalpel blade.  Given the location of the defect, shape of the defect and the proximity to free margins an A-T advancement flap was deemed most appropriate.  Using a sterile surgical marker, an appropriate advancement flap was drawn incorporating the defect and placing the expected incisions within the relaxed skin tension lines where possible.    The area thus outlined was incised deep to adipose tissue with a #15 scalpel blade.  The skin margins were undermined to an appropriate distance in all directions utilizing iris scissors.
Donor Site Anesthesia Type: same as repair anesthesia
Home Suture Removal Text: Patient was provided instructions on removing sutures and will remove their sutures at home.  If they have any questions or difficulties they will call the office.
Mohs Rapid Report Verbiage: The area of clinically evident tumor was marked with skin marking ink and appropriately hatched.  The initial incision was made following the Mohs approach through the skin.  The specimen was taken to the lab, divided into the necessary number of pieces, chromacoded and processed according to the Mohs protocol.  This was repeated in successive stages until a tumor free defect was achieved.
Stage 2: Additional Anesthesia Volume In Cc: 1
Deep Sutures: 4-0 Vicryl
Consent 3/Introductory Paragraph: I gave the patient a chance to ask questions they had about the procedure.  Following this I explained the Mohs procedure and consent was obtained. The risks, benefits and alternatives to therapy were discussed in detail. Specifically, the risks of infection, scarring, bleeding, prolonged wound healing, incomplete removal, allergy to anesthesia, nerve injury and recurrence were addressed. Prior to the procedure, the treatment site was clearly identified and confirmed by the patient. All components of Universal Protocol/PAUSE Rule completed.
Alar Island Pedicle Flap Text: The defect edges were debeveled with a #15 scalpel blade.  Given the location of the defect, shape of the defect and the proximity to the alar rim an island pedicle advancement flap was deemed most appropriate.  Using a sterile surgical marker, an appropriate advancement flap was drawn incorporating the defect, outlining the appropriate donor tissue and placing the expected incisions within the nasal ala running parallel to the alar rim. The area thus outlined was incised with a #15 scalpel blade.  The skin margins were undermined minimally to an appropriate distance in all directions around the primary defect and laterally outward around the island pedicle utilizing iris scissors.  There was minimal undermining beneath the pedicle flap.
Island Pedicle Flap Text: The defect edges were debeveled with a #15 scalpel blade.  Given the location of the defect, shape of the defect and the proximity to free margins an island pedicle advancement flap was deemed most appropriate.  Using a sterile surgical marker, an appropriate advancement flap was drawn incorporating the defect, outlining the appropriate donor tissue and placing the expected incisions within the relaxed skin tension lines where possible.    The area thus outlined was incised deep to adipose tissue with a #15 scalpel blade.  The skin margins were undermined to an appropriate distance in all directions around the primary defect and laterally outward around the island pedicle utilizing iris scissors.  There was minimal undermining beneath the pedicle flap.
Paramedian Forehead Flap Text: A decision was made to reconstruct the defect utilizing an interpolation axial flap and a staged reconstruction.  A telfa template was made of the defect.  This telfa template was then used to outline the paramedian forehead pedicle flap.  The donor area for the pedicle flap was then injected with anesthesia.  The flap was excised through the skin and subcutaneous tissue down to the layer of the underlying musculature.  The pedicle flap was carefully excised within this deep plane to maintain its blood supply.  The edges of the donor site were undermined.   The donor site was closed in a primary fashion.  The pedicle was then rotated into position and sutured.  Once the tube was sutured into place, adequate blood supply was confirmed with blanching and refill.  The pedicle was then wrapped with xeroform gauze and dressed appropriately with a telfa and gauze bandage to ensure continued blood supply and protect the attached pedicle.
Mucosal Advancement Flap Text: Given the location of the defect, shape of the defect and the proximity to free margins a mucosal advancement flap was deemed most appropriate. Incisions were made with a 15 blade scalpel in the appropriate fashion along the cutaneous vermilion border and the mucosal lip. The remaining actinically damaged mucosal tissue was excised.  The mucosal advancement flap was then elevated to the gingival sulcus with care taken to preserve the neurovascular structures and advanced into the primary defect. Care was taken to ensure that precise realignment of the vermilion border was achieved.
Bi-Rhombic Flap Text: The defect edges were debeveled with a #15 scalpel blade.  Given the location of the defect and the proximity to free margins a bi-rhombic flap was deemed most appropriate.  Using a sterile surgical marker, an appropriate rhombic flap was drawn incorporating the defect. The area thus outlined was incised deep to adipose tissue with a #15 scalpel blade.  The skin margins were undermined to an appropriate distance in all directions utilizing iris scissors.
Closure 3 Information: This tab is for additional flaps and grafts above and beyond our usual structured repairs.  Please note if you enter information here it will not currently bill and you will need to add the billing information manually.
Surgeon/Pathologist Verbiage (Will Incorporate Name Of Surgeon From Intro If Not Blank): operated in two distinct and integrated capacities as the surgeon and pathologist.
H Plasty Text: Given the location of the defect, shape of the defect and the proximity to free margins a H-plasty was deemed most appropriate for repair.  Using a sterile surgical marker, the appropriate advancement arms of the H-plasty were drawn incorporating the defect and placing the expected incisions within the relaxed skin tension lines where possible. The area thus outlined was incised deep to adipose tissue with a #15 scalpel blade. The skin margins were undermined to an appropriate distance in all directions utilizing iris scissors.  The opposing advancement arms were then advanced into place in opposite direction and anchored with interrupted buried subcutaneous sutures.
O-Z Plasty Text: The defect edges were debeveled with a #15 scalpel blade.  Given the location of the defect, shape of the defect and the proximity to free margins an O-Z plasty (double transposition flap) was deemed most appropriate.  Using a sterile surgical marker, the appropriate transposition flaps were drawn incorporating the defect and placing the expected incisions within the relaxed skin tension lines where possible.    The area thus outlined was incised deep to adipose tissue with a #15 scalpel blade.  The skin margins were undermined to an appropriate distance in all directions utilizing iris scissors.  Hemostasis was achieved with electrocautery.  The flaps were then transposed into place, one clockwise and the other counterclockwise, and anchored with interrupted buried subcutaneous sutures.
Closure 4 Information: This tab is for additional flaps and grafts above and beyond our usual structured repairs.  Please note if you enter information here it will not currently bill and you will need to add the billing information manually.
Hatchet Flap Text: The defect edges were debeveled with a #15 scalpel blade.  Given the location of the defect, shape of the defect and the proximity to free margins a hatchet flap was deemed most appropriate.  Using a sterile surgical marker, an appropriate hatchet flap was drawn incorporating the defect and placing the expected incisions within the relaxed skin tension lines where possible.    The area thus outlined was incised deep to adipose tissue with a #15 scalpel blade.  The skin margins were undermined to an appropriate distance in all directions utilizing iris scissors.
Inflammation Suggestive Of Cancer Camouflage Histology Text: There was a dense lymphocytic infiltrate which prevented adequate histologic evaluation of adjacent structures.
Spiral Flap Text: The defect edges were debeveled with a #15 scalpel blade.  Given the location of the defect, shape of the defect and the proximity to free margins a spiral flap was deemed most appropriate.  Using a sterile surgical marker, an appropriate rotation flap was drawn incorporating the defect and placing the expected incisions within the relaxed skin tension lines where possible. The area thus outlined was incised deep to adipose tissue with a #15 scalpel blade.  The skin margins were undermined to an appropriate distance in all directions utilizing iris scissors.
Skin Substitute Text: The defect edges were debeveled with a #15 scalpel blade.  Given the location of the defect, shape of the defect and the proximity to free margins a skin substitute graft was deemed most appropriate.  The graft material was trimmed to fit the size of the defect. The graft was then placed in the primary defect and oriented appropriately.
Z Plasty Text: The lesion was extirpated to the level of the fat with a #15 scalpel blade.  Given the location of the defect, shape of the defect and the proximity to free margins a Z-plasty was deemed most appropriate for repair.  Using a sterile surgical marker, the appropriate transposition arms of the Z-plasty were drawn incorporating the defect and placing the expected incisions within the relaxed skin tension lines where possible.    The area thus outlined was incised deep to adipose tissue with a #15 scalpel blade.  The skin margins were undermined to an appropriate distance in all directions utilizing iris scissors.  The opposing transposition arms were then transposed into place in opposite direction and anchored with interrupted buried subcutaneous sutures.
Advancement Flap (Double) Text: The defect edges were debeveled with a #15 scalpel blade.  Given the location of the defect and the proximity to free margins a double advancement flap was deemed most appropriate.  Using a sterile surgical marker, the appropriate advancement flaps were drawn incorporating the defect and placing the expected incisions within the relaxed skin tension lines where possible.    The area thus outlined was incised deep to adipose tissue with a #15 scalpel blade.  The skin margins were undermined to an appropriate distance in all directions utilizing iris scissors.
Consent (Near Eyelid Margin)/Introductory Paragraph: The rationale for Mohs was explained to the patient and consent was obtained. The risks, benefits and alternatives to therapy were discussed in detail. Specifically, the risks of ectropion or eyelid deformity, infection, scarring, bleeding, prolonged wound healing, incomplete removal, allergy to anesthesia, nerve injury and recurrence were addressed. Prior to the procedure, the treatment site was clearly identified and confirmed by the patient. All components of Universal Protocol/PAUSE Rule completed.
Tarsorrhaphy Text: A tarsorrhaphy was performed using Frost sutures.
Star Wedge Flap Text: The defect edges were debeveled with a #15 scalpel blade.  Given the location of the defect, shape of the defect and the proximity to free margins a star wedge flap was deemed most appropriate.  Using a sterile surgical marker, an appropriate rotation flap was drawn incorporating the defect and placing the expected incisions within the relaxed skin tension lines where possible. The area thus outlined was incised deep to adipose tissue with a #15 scalpel blade.  The skin margins were undermined to an appropriate distance in all directions utilizing iris scissors.
O-L Flap Text: The defect edges were debeveled with a #15 scalpel blade.  Given the location of the defect, shape of the defect and the proximity to free margins an O-L flap was deemed most appropriate.  Using a sterile surgical marker, an appropriate advancement flap was drawn incorporating the defect and placing the expected incisions within the relaxed skin tension lines where possible.    The area thus outlined was incised deep to adipose tissue with a #15 scalpel blade.  The skin margins were undermined to an appropriate distance in all directions utilizing iris scissors.
Bilateral Helical Rim Advancement Flap Text: The defect edges were debeveled with a #15 blade scalpel.  Given the location of the defect and the proximity to free margins (helical rim) a bilateral helical rim advancement flap was deemed most appropriate.  Using a sterile surgical marker, the appropriate advancement flaps were drawn incorporating the defect and placing the expected incisions between the helical rim and antihelix where possible.  The area thus outlined was incised through and through with a #15 scalpel blade.  With a skin hook and iris scissors, the flaps were gently and sharply undermined and freed up.
Alternatives Discussed Intro (Do Not Add Period): I discussed alternative treatments to Mohs surgery and specifically discussed the risks and benefits of
Bcc Histology Text: There were numerous aggregates of basaloid cells.
Island Pedicle Flap-Requiring Vessel Identification Text: The defect edges were debeveled with a #15 scalpel blade.  Given the location of the defect, shape of the defect and the proximity to free margins an island pedicle advancement flap was deemed most appropriate.  Using a sterile surgical marker, an appropriate advancement flap was drawn, based on the axial vessel mentioned above, incorporating the defect, outlining the appropriate donor tissue and placing the expected incisions within the relaxed skin tension lines where possible.    The area thus outlined was incised deep to adipose tissue with a #15 scalpel blade.  The skin margins were undermined to an appropriate distance in all directions around the primary defect and laterally outward around the island pedicle utilizing iris scissors.  There was minimal undermining beneath the pedicle flap.
Epidermal Autograft Text: The defect edges were debeveled with a #15 scalpel blade.  Given the location of the defect, shape of the defect and the proximity to free margins an epidermal autograft was deemed most appropriate.  Using a sterile surgical marker, the primary defect shape was transferred to the donor site. The epidermal graft was then harvested.  The skin graft was then placed in the primary defect and oriented appropriately.
Ear Wedge Repair Text: A wedge excision was completed by carrying down an excision through the full thickness of the ear and cartilage with an inward facing Burow's triangle. The wound was then closed in a layered fashion.
Postop Diagnosis: same
Referred To Otolaryngology For Closure Text (Leave Blank If You Do Not Want): After obtaining clear surgical margins the patient was sent to otolaryngology for surgical repair.  The patient understands they will receive post-surgical care and follow-up from the referring physician's office.
Bilobed Transposition Flap Text: The defect edges were debeveled with a #15 scalpel blade.  Given the location of the defect and the proximity to free margins a bilobed transposition flap was deemed most appropriate.  Using a sterile surgical marker, an appropriate bilobe flap drawn around the defect.    The area thus outlined was incised deep to adipose tissue with a #15 scalpel blade.  The skin margins were undermined to an appropriate distance in all directions utilizing iris scissors.
Mohs Histo Method Verbiage: Each section was then chromacoded and processed in the Mohs lab using the Mohs protocol and submitted for frozen section.
Consent 1/Introductory Paragraph: The rationale for Mohs was explained to the patient and consent was obtained. The risks, benefits and alternatives to therapy were discussed in detail. Specifically, the risks of infection, scarring, bleeding, prolonged wound healing, incomplete removal, allergy to anesthesia, nerve injury and recurrence were addressed. Prior to the procedure, the treatment site was clearly identified and confirmed by the patient. All components of Universal Protocol/PAUSE Rule completed.
Epidermal Closure: running and interrupted
Partial Purse String (Simple) Text: Given the location of the defect and the characteristics of the surrounding skin a simple purse string closure was deemed most appropriate.  Undermining was performed circumfirentially around the surgical defect.  A purse string suture was then placed and tightened. Wound tension only allowed a partial closure of the circular defect.
Consent Type: Consent 1 (Standard)
No Repair - Repaired With Adjacent Surgical Defect Text (Leave Blank If You Do Not Want): After obtaining clear surgical margins the defect was repaired concurrently with another surgical defect which was in close approximation.
V-Y Plasty Text: The defect edges were debeveled with a #15 scalpel blade.  Given the location of the defect, shape of the defect and the proximity to free margins an V-Y advancement flap was deemed most appropriate.  Using a sterile surgical marker, an appropriate advancement flap was drawn incorporating the defect and placing the expected incisions within the relaxed skin tension lines where possible.    The area thus outlined was incised deep to adipose tissue with a #15 scalpel blade.  The skin margins were undermined to an appropriate distance in all directions utilizing iris scissors.
Tissue Cultured Epidermal Autograft Text: The defect edges were debeveled with a #15 scalpel blade.  Given the location of the defect, shape of the defect and the proximity to free margins a tissue cultured epidermal autograft was deemed most appropriate.  The graft was then trimmed to fit the size of the defect.  The graft was then placed in the primary defect and oriented appropriately.
Body Location Override (Optional - Billing Will Still Be Based On Selected Body Map Location If Applicable): left distal forearm
Consent (Temporal Branch)/Introductory Paragraph: The rationale for Mohs was explained to the patient and consent was obtained. The risks, benefits and alternatives to therapy were discussed in detail. Specifically, the risks of damage to the temporal branch of the facial nerve, infection, scarring, bleeding, prolonged wound healing, incomplete removal, allergy to anesthesia, and recurrence were addressed. Prior to the procedure, the treatment site was clearly identified and confirmed by the patient. All components of Universal Protocol/PAUSE Rule completed.
Wound Care (No Sutures): Petrolatum
Mohs Case Number: [id number]
Unna Boot Text: An Unna boot was placed to help immobilize the limb and facilitate more rapid healing.
Cheek-To-Nose Interpolation Flap Text: A decision was made to reconstruct the defect utilizing an interpolation axial flap and a staged reconstruction.  A telfa template was made of the defect.  This telfa template was then used to outline the Cheek-To-Nose Interpolation flap.  The donor area for the pedicle flap was then injected with anesthesia.  The flap was excised through the skin and subcutaneous tissue down to the layer of the underlying musculature.  The interpolation flap was carefully excised within this deep plane to maintain its blood supply.  The edges of the donor site were undermined.   The donor site was closed in a primary fashion.  The pedicle was then rotated into position and sutured.  Once the tube was sutured into place, adequate blood supply was confirmed with blanching and refill.  The pedicle was then wrapped with xeroform gauze and dressed appropriately with a telfa and gauze bandage to ensure continued blood supply and protect the attached pedicle.
Repair Type: Complex Repair
Crescentic Advancement Flap Text: The defect edges were debeveled with a #15 scalpel blade.  Given the location of the defect and the proximity to free margins a crescentic advancement flap was deemed most appropriate.  Using a sterile surgical marker, the appropriate advancement flap was drawn incorporating the defect and placing the expected incisions within the relaxed skin tension lines where possible.    The area thus outlined was incised deep to adipose tissue with a #15 scalpel blade.  The skin margins were undermined to an appropriate distance in all directions utilizing iris scissors.
Cartilage Graft Text: The defect edges were debeveled with a #15 scalpel blade.  Given the location of the defect, shape of the defect, the fact the defect involved a full thickness cartilage defect a cartilage graft was deemed most appropriate.  An appropriate donor site was identified, cleansed, and anesthetized. The cartilage graft was then harvested and transferred to the recipient site, oriented appropriately and then sutured into place.  The secondary defect was then repaired using a primary closure.
Primary Defect Length In Cm (Final Defect Size - Required For Flaps/Grafts): 2.8
Mauc Instructions: By selecting yes to the question below the MAUC number will be added into the note.  This will be calculated automatically based on the diagnosis chosen, the size entered, the body zone selected (H,M,L) and the specific indications you chose. You will also have the option to override the Mohs AUC if you disagree with the automatically calculated number and this option is found in the Case Summary tab.
Mohs Method Verbiage: An incision at a 45 degree angle following the standard Mohs approach was done and the specimen was harvested as a microscopic controlled layer.
Ear Star Wedge Flap Text: The defect edges were debeveled with a #15 blade scalpel.  Given the location of the defect and the proximity to free margins (helical rim) an ear star wedge flap was deemed most appropriate.  Using a sterile surgical marker, the appropriate flap was drawn incorporating the defect and placing the expected incisions between the helical rim and antihelix where possible.  The area thus outlined was incised through and through with a #15 scalpel blade.
Dorsal Nasal Flap Text: The defect edges were debeveled with a #15 scalpel blade.  Given the location of the defect and the proximity to free margins a dorsal nasal flap was deemed most appropriate.  Using a sterile surgical marker, an appropriate dorsal nasal flap was drawn around the defect.    The area thus outlined was incised deep to adipose tissue with a #15 scalpel blade.  The skin margins were undermined to an appropriate distance in all directions utilizing iris scissors.
Anesthesia Volume In Cc: 4
Area M Indication Text: Tumors in this location are included in Area M (cheek, forehead, scalp, neck, jawline and pretibial skin).  Mohs surgery is indicated for tumors in these anatomic locations.
O-T Advancement Flap Text: The defect edges were debeveled with a #15 scalpel blade.  Given the location of the defect, shape of the defect and the proximity to free margins an O-T advancement flap was deemed most appropriate.  Using a sterile surgical marker, an appropriate advancement flap was drawn incorporating the defect and placing the expected incisions within the relaxed skin tension lines where possible.    The area thus outlined was incised deep to adipose tissue with a #15 scalpel blade.  The skin margins were undermined to an appropriate distance in all directions utilizing iris scissors.
Referred To Asc For Closure Text (Leave Blank If You Do Not Want): After obtaining clear surgical margins the patient was sent to an ASC for surgical repair.  The patient understands they will receive post-surgical care and follow-up from the ASC physician.
Same Histology In Subsequent Stages Text: The pattern and morphology of the tumor is as described in the first stage.
Split-Thickness Skin Graft Text: The defect edges were debeveled with a #15 scalpel blade.  Given the location of the defect, shape of the defect and the proximity to free margins a split thickness skin graft was deemed most appropriate.  Using a sterile surgical marker, the primary defect shape was transferred to the donor site. The split thickness graft was then harvested.  The skin graft was then placed in the primary defect and oriented appropriately.
Consent (Nose)/Introductory Paragraph: The rationale for Mohs was explained to the patient and consent was obtained. The risks, benefits and alternatives to therapy were discussed in detail. Specifically, the risks of nasal deformity, changes in the flow of air through the nose, infection, scarring, bleeding, prolonged wound healing, incomplete removal, allergy to anesthesia, nerve injury and recurrence were addressed. Prior to the procedure, the treatment site was clearly identified and confirmed by the patient. All components of Universal Protocol/PAUSE Rule completed.
Epidermal Sutures: 4-0 Prolene
Complex Repair And Graft Additional Text (Will Appearing After The Standard Complex Repair Text): The complex repair was not sufficient to completely close the primary defect. The remaining additional defect was repaired with the graft mentioned below.
Purse String (Simple) Text: Given the location of the defect and the characteristics of the surrounding skin a purse string closure was deemed most appropriate.  Undermining was performed circumfirentially around the surgical defect.  A purse string suture was then placed and tightened.
Advancement-Rotation Flap Text: The defect edges were debeveled with a #15 scalpel blade.  Given the location of the defect, shape of the defect and the proximity to free margins an advancement-rotation flap was deemed most appropriate.  Using a sterile surgical marker, an appropriate flap was drawn incorporating the defect and placing the expected incisions within the relaxed skin tension lines where possible. The area thus outlined was incised deep to adipose tissue with a #15 scalpel blade.  The skin margins were undermined to an appropriate distance in all directions utilizing iris scissors.
Melolabial Transposition Flap Text: The defect edges were debeveled with a #15 scalpel blade.  Given the location of the defect and the proximity to free margins a melolabial flap was deemed most appropriate.  Using a sterile surgical marker, an appropriate melolabial transposition flap was drawn incorporating the defect.    The area thus outlined was incised deep to adipose tissue with a #15 scalpel blade.  The skin margins were undermined to an appropriate distance in all directions utilizing iris scissors.
Mastoid Interpolation Flap Text: A decision was made to reconstruct the defect utilizing an interpolation axial flap and a staged reconstruction.  A telfa template was made of the defect.  This telfa template was then used to outline the mastoid interpolation flap.  The donor area for the pedicle flap was then injected with anesthesia.  The flap was excised through the skin and subcutaneous tissue down to the layer of the underlying musculature.  The pedicle flap was carefully excised within this deep plane to maintain its blood supply.  The edges of the donor site were undermined.   The donor site was closed in a primary fashion.  The pedicle was then rotated into position and sutured.  Once the tube was sutured into place, adequate blood supply was confirmed with blanching and refill.  The pedicle was then wrapped with xeroform gauze and dressed appropriately with a telfa and gauze bandage to ensure continued blood supply and protect the attached pedicle.

## 2018-06-22 ENCOUNTER — APPOINTMENT (RX ONLY)
Dept: URBAN - METROPOLITAN AREA CLINIC 91 | Facility: CLINIC | Age: 83
Setting detail: DERMATOLOGY
End: 2018-06-22

## 2018-06-22 DIAGNOSIS — Z48.02 ENCOUNTER FOR REMOVAL OF SUTURES: ICD-10-CM

## 2018-06-22 PROCEDURE — 99213 OFFICE O/P EST LOW 20 MIN: CPT

## 2018-06-22 PROCEDURE — ? SUTURE REMOVAL (NO GLOBAL PERIOD)

## 2018-06-22 ASSESSMENT — LOCATION ZONE DERM
LOCATION ZONE: ARM
LOCATION ZONE: ARM

## 2018-06-22 ASSESSMENT — LOCATION DETAILED DESCRIPTION DERM
LOCATION DETAILED: LEFT DISTAL DORSAL FOREARM
LOCATION DETAILED: LEFT DORSAL WRIST

## 2018-06-22 ASSESSMENT — LOCATION SIMPLE DESCRIPTION DERM
LOCATION SIMPLE: LEFT FOREARM
LOCATION SIMPLE: LEFT WRIST

## 2018-12-04 ENCOUNTER — HOSPITAL ENCOUNTER (OUTPATIENT)
Dept: HOSPITAL 18 - NAV RAD | Age: 83
Discharge: HOME | End: 2018-12-04
Payer: MEDICARE

## 2018-12-04 DIAGNOSIS — R06.02: Primary | ICD-10-CM

## 2018-12-04 PROCEDURE — 71046 X-RAY EXAM CHEST 2 VIEWS: CPT

## 2018-12-04 NOTE — RAD
TWO VIEWS CHEST:

 

DATE: 12/4/2018.

 

PROVIDED CLINICAL HISTORY: 

Shortness of breath.

 

FINDINGS: 

Comparison 10/8/2015.  Cardiac and mediastinal silhouette is within normal limits.  Vascular calcific
ation involves the aortic arch.  No focal consolidation, pleural fluid, or pneumothorax apparent.

 

IMPRESSION: 

No evidence for an acute cardiopulmonary process.

 

POS: [facility]

## 2019-01-16 ENCOUNTER — HOSPITAL ENCOUNTER (INPATIENT)
Dept: HOSPITAL 92 - ERS | Age: 84
LOS: 5 days | Discharge: HOME | DRG: 189 | End: 2019-01-21
Attending: HOSPITALIST | Admitting: HOSPITALIST
Payer: MEDICARE

## 2019-01-16 VITALS — BODY MASS INDEX: 21.4 KG/M2

## 2019-01-16 DIAGNOSIS — Z87.891: ICD-10-CM

## 2019-01-16 DIAGNOSIS — J44.1: ICD-10-CM

## 2019-01-16 DIAGNOSIS — Z79.82: ICD-10-CM

## 2019-01-16 DIAGNOSIS — J96.01: Primary | ICD-10-CM

## 2019-01-16 DIAGNOSIS — E03.9: ICD-10-CM

## 2019-01-16 DIAGNOSIS — I10: ICD-10-CM

## 2019-01-16 DIAGNOSIS — I25.10: ICD-10-CM

## 2019-01-16 DIAGNOSIS — E78.5: ICD-10-CM

## 2019-01-16 LAB
BASOPHILS # BLD AUTO: 0.1 THOU/UL (ref 0–0.2)
BASOPHILS NFR BLD AUTO: 0.7 % (ref 0–1)
EOSINOPHIL # BLD AUTO: 0.3 THOU/UL (ref 0–0.7)
EOSINOPHIL NFR BLD AUTO: 3.2 % (ref 0–10)
HGB BLD-MCNC: 13.7 G/DL (ref 12–16)
LYMPHOCYTES # BLD: 1.8 THOU/UL (ref 1.2–3.4)
LYMPHOCYTES NFR BLD AUTO: 17.3 % (ref 21–51)
MCH RBC QN AUTO: 28.6 PG (ref 27–31)
MCV RBC AUTO: 86.2 FL (ref 78–98)
MONOCYTES # BLD AUTO: 0.6 THOU/UL (ref 0.11–0.59)
MONOCYTES NFR BLD AUTO: 6.1 % (ref 0–10)
NEUTROPHILS # BLD AUTO: 7.4 THOU/UL (ref 1.4–6.5)
NEUTROPHILS NFR BLD AUTO: 72.7 % (ref 42–75)
PLATELET # BLD AUTO: 272 THOU/UL (ref 130–400)
RBC # BLD AUTO: 4.78 MILL/UL (ref 4.2–5.4)
WBC # BLD AUTO: 10.2 THOU/UL (ref 4.8–10.8)

## 2019-01-16 PROCEDURE — 80048 BASIC METABOLIC PNL TOTAL CA: CPT

## 2019-01-16 PROCEDURE — 85025 COMPLETE CBC W/AUTO DIFF WBC: CPT

## 2019-01-16 PROCEDURE — 96366 THER/PROPH/DIAG IV INF ADDON: CPT

## 2019-01-16 PROCEDURE — 85379 FIBRIN DEGRADATION QUANT: CPT

## 2019-01-16 PROCEDURE — 93005 ELECTROCARDIOGRAM TRACING: CPT

## 2019-01-16 PROCEDURE — 84484 ASSAY OF TROPONIN QUANT: CPT

## 2019-01-16 PROCEDURE — 36415 COLL VENOUS BLD VENIPUNCTURE: CPT

## 2019-01-16 PROCEDURE — 94640 AIRWAY INHALATION TREATMENT: CPT

## 2019-01-16 PROCEDURE — 94760 N-INVAS EAR/PLS OXIMETRY 1: CPT

## 2019-01-16 PROCEDURE — 87040 BLOOD CULTURE FOR BACTERIA: CPT

## 2019-01-16 PROCEDURE — 96374 THER/PROPH/DIAG INJ IV PUSH: CPT

## 2019-01-16 PROCEDURE — 71250 CT THORAX DX C-: CPT

## 2019-01-16 PROCEDURE — 87804 INFLUENZA ASSAY W/OPTIC: CPT

## 2019-01-16 PROCEDURE — 96367 TX/PROPH/DG ADDL SEQ IV INF: CPT

## 2019-01-16 PROCEDURE — 83880 ASSAY OF NATRIURETIC PEPTIDE: CPT

## 2019-01-16 PROCEDURE — 71045 X-RAY EXAM CHEST 1 VIEW: CPT

## 2019-01-16 PROCEDURE — 96365 THER/PROPH/DIAG IV INF INIT: CPT

## 2019-01-16 PROCEDURE — 82550 ASSAY OF CK (CPK): CPT

## 2019-01-16 NOTE — RAD
RADIOGRAPH CHEST 1 VIEW:

 

Date: 1/16/19

Time: 9:26 p.m.

 

HISTORY: 

86-year-old female with dyspnea and hypoxemia.

 

COMPARISON: 

12/4/18

 

FINDINGS:

Again noted is the hyperinflation consistent with COPD. The cardiomediastinal silhouette is normal. T
here are diffusely prominent interstitial markings. These appear more prominent now than on the prior
 study, but that could be due to technical differences. No consolidation or pneumothorax. Lateral cos
tophrenic angles are not effaced. 

 

IMPRESSION:

1.      Emphysema. 

2.      Prominent interstitial markings, chronic versus acute. Favor chronic.

 

 

TAN []

 

POS: [facility]

## 2019-01-17 RX ADMIN — MOMETASONE FUROATE AND FORMOTEROL FUMARATE DIHYDRATE SCH PUFF: 200; 5 AEROSOL RESPIRATORY (INHALATION) at 19:06

## 2019-01-17 RX ADMIN — ASPIRIN SCH MG: 81 TABLET ORAL at 10:05

## 2019-01-17 NOTE — HP
CHIEF COMPLAINT:  Dyspnea on exertion.



HISTORY OF PRESENT ILLNESS:  This patient is 86-year-old female, who has a long

history of smoking.  The patient reported that she was actually admitted in 2016 at

[facility] for pneumonia.  She reports that since her discharge,

she was sent home on oxygen therapy.  However, in 2017, she stated she felt fine and

returned the oxygen and has not used it routinely since.  She follows with Dr. Carranza

and reports that she has been having intermittent shortness of breath on exertion

for a good while.  Dr. Carranza referred her to Dr. Herrmann to ensure this was not

heart related situation.  She reports she had a negative stress test and

echocardiogram.  He has been treating her with Combivent and budesonide, but her

symptoms have persisted and so he has referred her to Pulmonology, but she was not

able to get an appointment before February.  She presented to the emergency

department with increasing dyspnea on exertion, progressive over the last couple of

weeks.  She does report that she has a cough, but the cough is nonproductive.  She

denies any chest pains, palpitations, or fevers or chills. 



REVIEW OF SYSTEMS:  All other systems reviewed, all pertinent positives and

negatives noted in the history of present illness. 



PAST MEDICAL HISTORY:  Notable for hypertension, coronary artery disease, and

hyperlipidemia.  She states she has never been diagnosed with chronic obstructive

pulmonary disease. 



PAST SURGICAL HISTORY:  Benign right breast mass removal and cholecystectomy.



FAMILY HISTORY:  Father  of heart disease.  Mother  of heart disease, also

had Alzheimer disease. 



SOCIAL HISTORY:  The patient smoked half pack to a pack a day and quit in 2016 when

she had pneumonia.  She has no history of alcohol use.  She is .  Her

daughter, Milana Mcghee would be her surrogate decision maker and she is a do not

intubate, but is amenable to cardiac resuscitation. 



ALLERGIES:  NONE.



MEDICATIONS:  

1. Levothyroxine 100 mcg daily.

2. Atenolol 25 daily.

3. Aspirin 81 mg daily.

4. Caduet 10/80 every evening.

5. Lisinopril 10 mg daily.



PHYSICAL EXAMINATION:

GENERAL APPEARANCE:  Age-appropriate female.  She is in some mild respiratory

distress.  She has been up around her room and did recover by the end of our exam as

she had sat back down on the bed for a while. 

HEENT:  PERRL.  No open lesions. 

NECK:  Supple and symmetric. 

HEART:  Regular rate and rhythm without murmurs, gallops, or rubs. 

LUNGS:  Have some modestly coarse of breath sounds throughout, but actually has fair

air exchange.  No wheezes or rales are noted.  Her abdomen is soft, nontender, and

nondistended.  Positive bowel sounds.  No masses.  No organomegaly. 

EXTREMITIES:  Warm and dry.  No cyanosis, clubbing or edema. 

NEUROLOGICALLY:  The patient is intact.  She is alert and oriented x3.  She moves

all extremities spontaneously. 

SKIN:  Exam reveals good turgor with no lesions.



LABORATORY DATA:  White count 10.2, hemoglobin 13.7, platelets 272.  CK 79, troponin

less than 0.01.  .  Chest x-ray shows no infiltrate, but some diffuse

interstitial markings which appear to be more likely chronic. 



IMPRESSION AND PLAN:  

1. Likely chronic obstructive pulmonary disease exacerbation.  The patient is

admitted.  She was started on steroids, nebulizer treatments, and supplemental

oxygen.  The patient had magnesium and Levaquin in the emergency room as well.  We

will continue with Levaquin.  We will consult Pulmonary given this is the patient's

first admission what looks like a chronic obstructive pulmonary disease and she was

referred by her PCP to have follow up with Pulmonology prior to this admission.  We

will also obtain a CT scan of the chest given her increased interstitial markings.

Certainly possible.  The patient may have some significant 

bronchiectasis as well.

2. Hypertension.  Continue with her usual amlodipine and atenolol.

3. Hyperlipidemia.  Continue with atorvastatin.

4. Hypothyroidism.  Continue with the Synthroid.







Job ID:  [id number]

## 2019-01-17 NOTE — CT
NONCONTRAST ENHANCED CT CHEST:

 

HISTORY: 

Shortness of breath.  Lump removed from breast which was benign.

 

FINDINGS: 

Noncontrast-enhanced CT of the chest demonstrates some mild increased interstitial markings seen in t
he lung bases compatible with interstitial fibrotic changes.  

 

Minimal areas of patchy density are also seen in the lingula and right middle lobe.  

 

Marked atherosclerotic calcifications are seen in the abdominal aorta and coronary arteries.

 

No significant evidence of mediastinal or axillary lymphadenopathy is seen.

 

Surgical clips are seen in the gallbladder fossa.  Calcifications are seen in the hepatic parenchyma.


 

IMPRESSION: 

1.  Some lung parenchymal interstitial fibrotic change is noted.  No significant evidence of pneumoni
a seen.  No significant evidence of lymphadenopathy seen.

 

2.  Extensive coronary and aortic calcifications.

 

POS: [facility]

## 2019-01-17 NOTE — CON
DATE OF CONSULTATION:  



HISTORY OF PRESENT ILLNESS:  This is an 86-year-old female with known history of

COPD, smoked two packs a day for most of her life, 50, quit smoking about two years

ago.  She presented to the hospital with increasing shortness of breath and cough,

unresponsive to usual medication, which includes DuoNeb, budesonide, albuterol

inhaler.  She has a previous history of pneumonia in 2016.  At most, she can barely

walk 20 feet without getting markedly dyspneic. 



PAST MEDICAL HISTORY:  Otherwise pertinent for COPD, coronary artery disease, and

hypertension. 



PREVIOUS SURGERIES:  Have included cholecystectomy, a single stent in 2001.



HOME MEDICATIONS:  Include,

1. Synthroid 100.

2. Atenolol 25.

3. Aspirin once.

4. Caduet one.

5. Lisinopril 10.



ALLERGIES:  NONE.



SOCIAL HISTORY:  Unremarkable.  Alcohol, none.  Tobacco, none



FAMILY HISTORY:  Unremarkable.



REVIEW OF SYSTEMS:  Otherwise 10 point negative.



PHYSICAL EXAMINATION:

VITAL SIGNS:  Saturations are 97% on 2 L, respiratory rate 16, temperature 97, and

blood pressure 143/73. 

CHEST:  Decreased breath sounds.  Minimal rhonchi.  Minimal wheezing. 

CARDIAC:  Normal S1 and S2.  No gallop. 

ABDOMEN:  No masses.



LABORATORY DATA AND DIAGNOSTIC STUDIES:  White count 10,000.  Chest x-ray shows no

acute infiltrates, hyperinflation.  CAT scan, chest shows no PE, bibasilar

nonspecific scarring. 



IMPRESSION:  

1. Chronic obstructive pulmonary disease exacerbation, bronchitis, possibly

superimposed pneumonia. 

2. Coronary artery disease.



PLAN:  Continue neb treatments, steroids have added, Dulera prior to discharge,

consider whole PFT. 



Consultation note, 70 minutes, 50% direct patient care.







Job ID:  [id number]

## 2019-01-18 LAB
ANION GAP SERPL CALC-SCNC: 12 MMOL/L (ref 10–20)
BASOPHILS # BLD AUTO: 0.1 THOU/UL (ref 0–0.2)
BASOPHILS NFR BLD AUTO: 0.8 % (ref 0–1)
BUN SERPL-MCNC: 13 MG/DL (ref 9.8–20.1)
CALCIUM SERPL-MCNC: 8.6 MG/DL (ref 7.8–10.44)
CHLORIDE SERPL-SCNC: 104 MMOL/L (ref 98–107)
CO2 SERPL-SCNC: 22 MMOL/L (ref 23–31)
CREAT CL PREDICTED SERPL C-G-VRATE: 55 ML/MIN (ref 70–130)
EOSINOPHIL # BLD AUTO: 0 THOU/UL (ref 0–0.7)
EOSINOPHIL NFR BLD AUTO: 0.2 % (ref 0–10)
GLUCOSE SERPL-MCNC: 155 MG/DL (ref 83–110)
HGB BLD-MCNC: 11.7 G/DL (ref 12–16)
LYMPHOCYTES # BLD: 1.1 THOU/UL (ref 1.2–3.4)
LYMPHOCYTES NFR BLD AUTO: 10.3 % (ref 21–51)
MCH RBC QN AUTO: 28.4 PG (ref 27–31)
MCV RBC AUTO: 88.5 FL (ref 78–98)
MONOCYTES # BLD AUTO: 0.2 THOU/UL (ref 0.11–0.59)
MONOCYTES NFR BLD AUTO: 2.3 % (ref 0–10)
NEUTROPHILS # BLD AUTO: 8.9 THOU/UL (ref 1.4–6.5)
NEUTROPHILS NFR BLD AUTO: 86.4 % (ref 42–75)
PLATELET # BLD AUTO: 233 THOU/UL (ref 130–400)
POTASSIUM SERPL-SCNC: 3.9 MMOL/L (ref 3.5–5.1)
RBC # BLD AUTO: 4.13 MILL/UL (ref 4.2–5.4)
SODIUM SERPL-SCNC: 134 MMOL/L (ref 136–145)
WBC # BLD AUTO: 10.3 THOU/UL (ref 4.8–10.8)

## 2019-01-18 RX ADMIN — ASPIRIN SCH MG: 81 TABLET ORAL at 09:12

## 2019-01-18 RX ADMIN — MOMETASONE FUROATE AND FORMOTEROL FUMARATE DIHYDRATE SCH PUFF: 200; 5 AEROSOL RESPIRATORY (INHALATION) at 18:55

## 2019-01-18 RX ADMIN — MOMETASONE FUROATE AND FORMOTEROL FUMARATE DIHYDRATE SCH PUFF: 200; 5 AEROSOL RESPIRATORY (INHALATION) at 06:23

## 2019-01-18 NOTE — PQF
BILLY BARKER DAVID R MD

[id number]                                                             SURG A-
[id number]

[id number]                             

                                   

CLINICAL DOCUMENTATION IMPROVEMENT CLARIFICATION FORM:  ICD-10 Updated



PLEASE DO AN ADDENDUM TO THE PROGRESS NOTE WITH ANY DOCUMENTATION UPDATES OR 
ADDITIONS AND CARRY THROUGH TO DC SUMMARY.   THANK YOU.



DATE:   1/18/2019                                 ATTN:  DR. ORTIZ



Please exercise your independent, professional judgment in responding to the 
clarification form. 

Clinical indicators are provided on the bottom of this form for your review



Please check appropriate box(s):



[  x] Acute Respiratory Failure:   [ x ] with Hypoxia[  ] with Hypercapnia



[  ] Acute On Chronic Respiratory Failure:      [  ] with Hypoxia    [  ] with 
Hypercapnia



[  ] Chronic Respiratory Failure only:        [  ] with Hypoxia       [  ] with 
Hypercapnia



[  ] Hypoxia



[  ] Other diagnosis ___________



[  ] Unable to determine







For continuity of documentation, please document condition throughout progress 
notes and discharge summary.  Thank You.





CLINICAL INDICATORS - SIGNS / SYMPTOMS / LABS

1/16-ER Triage Note: Shortness of breath. O2 sat 80 initially. Patient is 
breathing 32 times a minute and is on 3L NC. 

1/17-H&P on exam includes: She is in some mild respiratory distress. 



RISK FACTORS

86-y/o Female

Tobacco abuse / exposure; quit in 2016



TREATMENTS

Oxygen

Monitoring of oxygenation status

Respiratory treatments

Pulmonary Consult

Possibly to include home oxygen (if qualifies)







Thank You, 

Anjelica





(This form is maintained as a part of the permanent medical record)

 2015 [place] [employer], [employer].  All Rights Reserved

Anjelica Jones RN, CDIS    [email or web address]    [phone]
[facility]

## 2019-01-18 NOTE — PDOC.PN
- Subjective


Encounter Start Date: 01/18/19


Encounter Start Time: 11:15





Breathing is better, but still feels a little SOB at times.  Thinks she would 

benefit from home oxygen.





- Objective


Resuscitation Status - Order Detail:





01/17/19 08:48


Resuscitation Status Routine 


   Resuscitation Status: PRTL: Cardiac only


   Discussed with: Patient








Vital Signs & Weight: 


 Vital Signs (12 hours)











  Temp Pulse Resp BP Pulse Ox


 


 01/18/19 11:23  97.3 F L  81  20  125/70  97


 


 01/18/19 10:11   87  16   93 L


 


 01/18/19 07:41  97.3 F L  87  22 H  152/70 H  95


 


 01/18/19 06:23   80  18   96


 


 01/18/19 06:21   80  18   96


 


 01/18/19 04:00  97.8 F  80  20  126/62  96


 


 01/18/19 02:14   85  16   95








 Weight











Weight                         121 lb 6.4 oz














I&O: 


 











 01/17/19 01/18/19 01/19/19





 06:59 06:59 06:59


 


Intake Total 480 1870 


 


Balance 480 1870 











Result Diagrams: 


 01/18/19 07:07





 01/18/19 07:07





Phys Exam





- Physical Examination


Constitutional: NAD


Respiratory: no wheezing, no rales, no rhonchi


Diminished throughout. 


Cardiovascular: RRR, no significant murmur, no rub


Gastrointestinal: soft, non-tender, no distention, positive bowel sounds


Musculoskeletal: no edema


Neurological: non-focal


Psychiatric: normal affect, A&O x 3





Dx/Plan


(1) COPD exacerbation


Code(s): J44.1 - CHRONIC OBSTRUCTIVE PULMONARY DISEASE W (ACUTE) EXACERBATION   

Status: Acute   





(2) Hypertension


Code(s): I10 - ESSENTIAL (PRIMARY) HYPERTENSION   Status: Acute   





(3) Hyperlipidemia


Code(s): E78.5 - HYPERLIPIDEMIA, UNSPECIFIED   Status: Acute   





(4) Hypothyroidism


Code(s): E03.9 - HYPOTHYROIDISM, UNSPECIFIED   Status: Acute   





- Plan





* Converted to oral meds.


* Check room air sats to see if she will qualify for home oxygen.


* Ambulate as tolerated.


* continue nebs and steroids.


* Discharge when stable with oral regimen.

## 2019-01-18 NOTE — PRG
DATE OF SERVICE:  01/18/2019



SUBJECTIVE:  This morning, she is better.  She is less short of breath, less

coughing, less wheezing. 



OBJECTIVE:  VITAL SIGNS:  Saturations are 95 on 2L, respiratory rate 20, temperature

97, blood pressure 152/70. 

CHEST:  Decreased breath sounds.  No wheezing. 

CARDIAC:  Normal S1, S2.  No gallops. 

ABDOMEN:  No masses.



LABORATORY DATA:  Lytes are normal.



IMPRESSION:  

1. Chronic obstructive pulmonary disease exacerbation.

2. Bronchitis.

3. Former smoker.



PLAN:  Switch her over to oral antibiotics and oral steroids.



DISPOSITION:  Hopefully home in the next 24-48 hours.







Job ID:  [id number]

## 2019-01-19 RX ADMIN — MOMETASONE FUROATE AND FORMOTEROL FUMARATE DIHYDRATE SCH PUFF: 200; 5 AEROSOL RESPIRATORY (INHALATION) at 19:02

## 2019-01-19 RX ADMIN — ASPIRIN SCH MG: 81 TABLET ORAL at 09:12

## 2019-01-19 RX ADMIN — MOMETASONE FUROATE AND FORMOTEROL FUMARATE DIHYDRATE SCH PUFF: 200; 5 AEROSOL RESPIRATORY (INHALATION) at 07:37

## 2019-01-19 NOTE — PDOC.PN
- Subjective


Encounter Start Date: 01/19/19


Encounter Start Time: 10:15





Doing better overall.  Had SaO2 in mid 80's with ambulation.  Doing well with 

the  oxygen.


Had an episode of confusion this morning.  She recalls it well.  She thinks her 

oxygen came off.  Nurse indicated that they woke her and opened the curtains 

thinks she may have just been disoriented.  





- Objective


Resuscitation Status - Order Detail:





01/17/19 08:48


Resuscitation Status Routine 


   Resuscitation Status: PRTL: Cardiac only


   Discussed with: Patient








Vital Signs & Weight: 


 Vital Signs (12 hours)











  Temp Pulse Resp BP BP Pulse Ox


 


 01/19/19 16:17   84  20   


 


 01/19/19 15:09  97.6 F  71  20   124/65  96


 


 01/19/19 11:10  97.4 F L  69  22 H   148/80 H  97


 


 01/19/19 10:48   88  20   


 


 01/19/19 09:13   74   150/70 H  


 


 01/19/19 09:12     150/70 H  


 


 01/19/19 08:10       95


 


 01/19/19 07:48  98.3 F  74  20   150/70 H  96


 


 01/19/19 07:37   75  20    97


 


 01/19/19 07:16       97


 


 01/19/19 07:12   75  20    97








 Weight











Weight                         121 lb 6.4 oz














I&O: 


 











 01/18/19 01/19/19 01/20/19





 06:59 06:59 06:59


 


Intake Total 1870 1740 


 


Balance 1870 1740 











Result Diagrams: 


 01/18/19 07:07





 01/18/19 07:07





Phys Exam





- Physical Examination


Constitutional: NAD


Respiratory: no wheezing, no rales, no rhonchi


Diminished


Cardiovascular: RRR, no significant murmur, no rub


Gastrointestinal: soft, non-tender, no distention, positive bowel sounds


Musculoskeletal: no edema





Dx/Plan


(1) COPD exacerbation


Code(s): J44.1 - CHRONIC OBSTRUCTIVE PULMONARY DISEASE W (ACUTE) EXACERBATION   

Status: Acute   





(2) Hypertension


Code(s): I10 - ESSENTIAL (PRIMARY) HYPERTENSION   Status: Acute   





(3) Hyperlipidemia


Code(s): E78.5 - HYPERLIPIDEMIA, UNSPECIFIED   Status: Acute   





(4) Hypothyroidism


Code(s): E03.9 - HYPOTHYROIDISM, UNSPECIFIED   Status: Acute   





- Plan





* Improved overall.  


* Work on home oxygen.


* Continue nebs, steroids, abx.


* Ambulate.

## 2019-01-19 NOTE — EKG
Test Reason : 

Blood Pressure : ***/*** mmHG

Vent. Rate : 096 BPM     Atrial Rate : 096 BPM

   P-R Int : 136 ms          QRS Dur : 080 ms

    QT Int : 356 ms       P-R-T Axes : 069 080 070 degrees

   QTc Int : 449 ms

 

Normal sinus rhythm

Normal ECG

 

Confirmed by TERRANCE CARRANZA DO ([id number]),  DESMOND WINKLER ([id number]) on 1/19/2019 10:50:26 AM

 

Referred By:             Confirmed By:TERRANCE CARRANZA DO

## 2019-01-19 NOTE — PRG
DATE OF SERVICE:  01/19/2019



SUBJECTIVE:  She says she is feeling better. 



She says last year she has had at least three episodes of shortness of breath that

improved with steroids.  She is hoping this will not happen again.  She just quit

smoking two and half to three years ago.  She is afebrile, heart rate is 80,

respiratory rate 18, oximetry is 95% on 1 L. 



OBJECTIVE:  LUNGS:  Clear. 

HEART:  Regular rhythm. 

ABDOMEN:  Soft.



IMPRESSION:  Chronic obstructive pulmonary disease exacerbation with a component of

reactive airway.  Adding an Singulair may help.  She is already on budesonide twice

a day via nebulizer and ipratropium and albuterol 3-4 times a day.  I doubt the

atenolol at 25 mg a day is contributing to her bronchospasm.  We will continue to

follow.  Hopefully, she will be a candidate for discharge tomorrow. 







Job ID:  [id number]

## 2019-01-20 RX ADMIN — MOMETASONE FUROATE AND FORMOTEROL FUMARATE DIHYDRATE SCH PUFF: 200; 5 AEROSOL RESPIRATORY (INHALATION) at 12:07

## 2019-01-20 RX ADMIN — ASPIRIN SCH MG: 81 TABLET ORAL at 09:25

## 2019-01-20 RX ADMIN — MOMETASONE FUROATE AND FORMOTEROL FUMARATE DIHYDRATE SCH PUFF: 200; 5 AEROSOL RESPIRATORY (INHALATION) at 18:42

## 2019-01-20 NOTE — PRG
DATE OF SERVICE:  01/20/2019



SUBJECTIVE:  Ms. Jensen is upset and wanted to be discharged today.  She tells me

her room air O2 saturation was 94%. 



She said she was ambulating, she desaturated.  This is not unexpected.



OBJECTIVE:  VITAL SIGNS:  She is afebrile, heart rate is 86, respiratory rate is 20,

oximetry is 95, now 1L/minute. 

LUNGS:  Clear. 

HEART:  Regular rhythm. 

ABDOMEN:  Soft.



IMPRESSION:  Chronic obstructive pulmonary disease exacerbation.  The room air

resting O2 saturation is greater than or equal to 90.  She does not have to be

discharged home with oxygen. 



There is no clinical proven benefit for length of life with oxygen therapy to treat

desaturation with exercise.  This is expected with patients have chronic obstructive

pulmonary disease.  She is not having any chest pain when she is ambulating or

anything to suggest that she has unstable angina.  She does not want to go home with

oxygen.  If her room air saturations greater than 90 at rest tomorrow, I would

discharge her home for nebulizer treatments and close outpatient followup with the

10- to 14-day steroid taper. 







Job ID:  [id number]

## 2019-01-20 NOTE — PDOC.PN
- Subjective


Encounter Start Date: 01/20/19


Encounter Start Time: 11:00





Patient is feeling ok, but frustrated because she was cold all night.  Had some 

issues with the thermostat.  Wants to go home and wants to go by 2:00 or she 

will not have a ride.  Says we can use any home oxygen company if [employer] is 

not responding. 





- Objective


Resuscitation Status - Order Detail:





01/17/19 08:48


Resuscitation Status Routine 


   Resuscitation Status: PRTL: Cardiac only


   Discussed with: Patient








Vital Signs & Weight: 


 Vital Signs (12 hours)











  Temp Pulse Resp BP BP Pulse Ox


 


 01/20/19 15:25   76  20   


 


 01/20/19 12:07   88  24 H   


 


 01/20/19 11:37   88  24 H   


 


 01/20/19 11:03  97.6 F  77  22 H   161/79 H  97


 


 01/20/19 09:28     146/70 H  


 


 01/20/19 09:27   86   146/70 H  


 


 01/20/19 09:26   86   146/70 H  


 


 01/20/19 08:20       95


 


 01/20/19 07:40  97.7 F  86  20   119/55 L  100


 


 01/20/19 07:33       94 L


 


 01/20/19 07:31   94  20    94 L


 


 01/20/19 04:00  97.7 F  81  20   152/71 H  93 L








 Weight











Weight                         121 lb 6.4 oz














I&O: 


 











 01/19/19 01/20/19 01/21/19





 06:59 06:59 06:59


 


Intake Total 1740 960 


 


Balance 1740 960 











Result Diagrams: 


 01/18/19 07:07





 01/18/19 07:07





Phys Exam





- Physical Examination


Constitutional: NAD


Comfortable in bed with oxygen.


Respiratory: no wheezing, no rales, no rhonchi, clear to auscultation bilateral


Cardiovascular: RRR, no significant murmur, no rub


Gastrointestinal: soft, non-tender, no distention


Musculoskeletal: no edema


Psychiatric: normal affect, A&O x 3





Dx/Plan


(1) Acute respiratory failure with hypoxia


Code(s): J96.01 - ACUTE RESPIRATORY FAILURE WITH HYPOXIA   Status: Acute   





(2) COPD exacerbation


Code(s): J44.1 - CHRONIC OBSTRUCTIVE PULMONARY DISEASE W (ACUTE) EXACERBATION   

Status: Acute   





(3) Hypertension


Code(s): I10 - ESSENTIAL (PRIMARY) HYPERTENSION   Status: Acute   





(4) Hyperlipidemia


Code(s): E78.5 - HYPERLIPIDEMIA, UNSPECIFIED   Status: Acute   





(5) Hypothyroidism


Code(s): E03.9 - HYPOTHYROIDISM, UNSPECIFIED   Status: Acute   





- Plan





* Repeat room air sats today.


* CM working on obtaining home oxygen.


* Continue nebs, steroids, montelukast.


* Can be discharged when the home oxygen is set up.

## 2019-01-21 VITALS — DIASTOLIC BLOOD PRESSURE: 79 MMHG | TEMPERATURE: 97.6 F | SYSTOLIC BLOOD PRESSURE: 150 MMHG

## 2019-01-21 RX ADMIN — ASPIRIN SCH MG: 81 TABLET ORAL at 08:09

## 2019-01-21 RX ADMIN — MOMETASONE FUROATE AND FORMOTEROL FUMARATE DIHYDRATE SCH PUFF: 200; 5 AEROSOL RESPIRATORY (INHALATION) at 06:37

## 2020-10-14 ENCOUNTER — HOSPITAL ENCOUNTER (EMERGENCY)
Dept: HOSPITAL 18 - NAV ERS | Age: 85
Discharge: HOME | End: 2020-10-14
Payer: MEDICARE

## 2020-10-14 DIAGNOSIS — I10: ICD-10-CM

## 2020-10-14 DIAGNOSIS — S39.012A: Primary | ICD-10-CM
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
## 2020-10-14 DIAGNOSIS — S00.83XA: ICD-10-CM

## 2020-10-14 DIAGNOSIS — F17.210: ICD-10-CM

## 2020-10-14 DIAGNOSIS — Z79.82: ICD-10-CM

## 2020-10-14 DIAGNOSIS — J44.9: ICD-10-CM

## 2020-10-14 DIAGNOSIS — S00.03XA: ICD-10-CM

## 2020-10-14 DIAGNOSIS — Z79.899: ICD-10-CM

## 2020-10-14 PROCEDURE — 72131 CT LUMBAR SPINE W/O DYE: CPT

## 2020-10-14 PROCEDURE — 70450 CT HEAD/BRAIN W/O DYE: CPT

## 2020-10-14 NOTE — CT
CT BRAIN WITHOUT CONTRAST:



HISTORY:Injury, headache



COMPARISON:None



FINDINGS:

There are foci of decreased attenuation in the periventricular white matter, consistent with chronic 
small vessel ischemic disease. There are changes of cortical atrophy.



No evidence of acute infarct, hemorrhage, midline shift or abnormal extra-axial fluid collections is 
seen. The ventricular size is appropriate and the basilar cisterns are patent. The bony calvarium

is intact.



The visualized paranasal sinuses and mastoid air cells are well aerated.



IMPRESSION: No CT evidence of acute intracranial process. 



Reported By: Teja Ricci 

Electronically Signed:  10/14/2020 12:08 PM

## 2020-10-14 NOTE — CT
CT of the lumbar spine:

10/14/2020



COMPARISON: CT of the abdomen and pelvis 6/10/2019



History: Injury



TECHNIQUE: Axial CT imaging at 2.5 mm intervals through the lumbar spine provided without contrast. C
oronal and sagittal reformatted imaging obtained.



FINDINGS: Evaluation for central canal and/or neural foraminal stenosis is limited on routine CT exam
.



There are bibasilar partially imaged reticulonodular densities, unchanged when compared to 6/10/2019 
CT of the abdomen and pelvis.



Scattered atherosclerotic calcifications are seen involving the abdominal aorta and its branches, not
 optimally assessed on this exam.



There is an infrarenal abdominal aortic aneurysm suspected measuring in the 3.1 cm range, only partia
lly visualized on this exam.



No significant anterolisthesis or retrolisthesis is noted within the lumbar spine.



There is a anterior wedge compression fracture primarily involving the superior endplate with a sligh
t burst configuration given minimal osseous retropulsion involving the L2 vertebral body. This

fracture appears stable when compared to 6/10/2019 CT of the abdomen and pelvis.



T12-L1: Mild bilateral facet hypertrophy with no osseous cause of significant central canal or neural
 foraminal stenosis.



L1-2: Mild central canal stenosis on the basis of disc osteophyte complex and slight osseous retropul
tay associated with superior endplate fracture at L2. No osseous cause of significant neural

foraminal stenosis.



L2-3: Mild bilateral facet hypertrophy with no osseous cause of significant central canal or neural f
oraminal stenosis.



L3-4: There is bilateral facet hypertrophy and hypertrophy of the ligamentum flavum. Probable mild ce
ntral canal stenosis. Bilateral facet hypertrophy with probable mild bilateral neural foraminal

stenosis.



L4-5: There is disc space narrowing and disc bulge. There is prominent bilateral facet hypertrophy an
d hypertrophy of ligamentum flavum. Moderate central canal stenosis is suspected. Moderate

bilateral neural foraminal stenosis noted.



L5-S1: Bilateral facet hypertrophy is noted. Probable mild/moderate central canal stenosis on the bas
is of disc bulge and facet hypertrophy. Mild right and moderate left neural foraminal stenosis

suspected.



No worrisome lytic or blastic bone lesion.



No acute fracture or dislocation.



IMPRESSION: Chronic findings as detailed above. No evidence for an acute fracture is seen within the 
lumbar spine.



Reported By: Yordan Rain 

Electronically Signed:  10/14/2020 12:23 PM

## 2021-01-04 ENCOUNTER — HOSPITAL ENCOUNTER (INPATIENT)
Dept: HOSPITAL 18 - NAV ERS | Age: 86
LOS: 4 days | Discharge: ANOTHER HOSPITAL | DRG: 178 | End: 2021-01-08
Attending: INTERNAL MEDICINE | Admitting: INTERNAL MEDICINE
Payer: MEDICARE

## 2021-01-04 VITALS — BODY MASS INDEX: 21.9 KG/M2

## 2021-01-04 DIAGNOSIS — F41.9: ICD-10-CM

## 2021-01-04 DIAGNOSIS — Z99.81: ICD-10-CM

## 2021-01-04 DIAGNOSIS — I10: ICD-10-CM

## 2021-01-04 DIAGNOSIS — R19.7: ICD-10-CM

## 2021-01-04 DIAGNOSIS — I25.10: ICD-10-CM

## 2021-01-04 DIAGNOSIS — Z87.891: ICD-10-CM

## 2021-01-04 DIAGNOSIS — Z79.890: ICD-10-CM

## 2021-01-04 DIAGNOSIS — F32.9: ICD-10-CM

## 2021-01-04 DIAGNOSIS — R53.81: ICD-10-CM

## 2021-01-04 DIAGNOSIS — Z95.5: ICD-10-CM

## 2021-01-04 DIAGNOSIS — E03.9: ICD-10-CM

## 2021-01-04 DIAGNOSIS — Z98.890: ICD-10-CM

## 2021-01-04 DIAGNOSIS — Z79.899: ICD-10-CM

## 2021-01-04 DIAGNOSIS — J44.9: ICD-10-CM

## 2021-01-04 DIAGNOSIS — U07.1: Primary | ICD-10-CM

## 2021-01-04 DIAGNOSIS — Z90.49: ICD-10-CM

## 2021-01-04 DIAGNOSIS — Z79.82: ICD-10-CM

## 2021-01-04 DIAGNOSIS — J96.11: ICD-10-CM

## 2021-01-04 DIAGNOSIS — Z82.49: ICD-10-CM

## 2021-01-04 DIAGNOSIS — E78.5: ICD-10-CM

## 2021-01-04 LAB
ALBUMIN SERPL BCG-MCNC: 3.6 G/DL (ref 3.4–4.8)
ALP SERPL-CCNC: 67 U/L (ref 40–110)
ALT SERPL W P-5'-P-CCNC: 18 U/L (ref 8–55)
ANION GAP SERPL CALC-SCNC: 18 MMOL/L (ref 10–20)
AST SERPL-CCNC: 26 U/L (ref 5–34)
BASOPHILS # BLD AUTO: 0 THOU/UL (ref 0–0.2)
BASOPHILS NFR BLD AUTO: 0.3 % (ref 0–1)
BILIRUB SERPL-MCNC: 0.4 MG/DL (ref 0.2–1.2)
BUN SERPL-MCNC: 21 MG/DL (ref 9.8–20.1)
CALCIUM SERPL-MCNC: 7.9 MG/DL (ref 7.8–10.44)
CHLORIDE SERPL-SCNC: 103 MMOL/L (ref 98–107)
CO2 SERPL-SCNC: 19 MMOL/L (ref 23–31)
CREAT CL PREDICTED SERPL C-G-VRATE: 0 ML/MIN (ref 70–130)
EOSINOPHIL # BLD AUTO: 0 THOU/UL (ref 0–0.7)
EOSINOPHIL NFR BLD AUTO: 0 % (ref 0–10)
GLOBULIN SER CALC-MCNC: 2.5 G/DL (ref 2.4–3.5)
GLUCOSE SERPL-MCNC: 101 MG/DL (ref 83–110)
HGB BLD-MCNC: 13.5 G/DL (ref 12–16)
LYMPHOCYTES # BLD AUTO: 0.9 THOU/UL (ref 1.2–3.4)
LYMPHOCYTES NFR BLD AUTO: 14.6 % (ref 21–51)
MCH RBC QN AUTO: 27.3 PG (ref 27–31)
MCV RBC AUTO: 87.9 FL (ref 78–98)
MONOCYTES # BLD AUTO: 0.4 THOU/UL (ref 0.11–0.59)
MONOCYTES NFR BLD AUTO: 5.9 % (ref 0–10)
NEUTROPHILS # BLD AUTO: 4.7 THOU/UL (ref 1.4–6.5)
NEUTROPHILS NFR BLD AUTO: 79.2 % (ref 42–75)
PLATELET # BLD AUTO: 125 THOU/UL (ref 130–400)
POTASSIUM SERPL-SCNC: 3.8 MMOL/L (ref 3.5–5.1)
RBC # BLD AUTO: 4.97 MILL/UL (ref 4.2–5.4)
SARS-COV-2 RNA RESP QL NAA+PROBE: DETECTED
SODIUM SERPL-SCNC: 136 MMOL/L (ref 136–145)
WBC # BLD AUTO: 5.9 THOU/UL (ref 4.8–10.8)

## 2021-01-04 PROCEDURE — 94760 N-INVAS EAR/PLS OXIMETRY 1: CPT

## 2021-01-04 PROCEDURE — 85025 COMPLETE CBC W/AUTO DIFF WBC: CPT

## 2021-01-04 PROCEDURE — 85379 FIBRIN DEGRADATION QUANT: CPT

## 2021-01-04 PROCEDURE — 96375 TX/PRO/DX INJ NEW DRUG ADDON: CPT

## 2021-01-04 PROCEDURE — 83880 ASSAY OF NATRIURETIC PEPTIDE: CPT

## 2021-01-04 PROCEDURE — 94640 AIRWAY INHALATION TREATMENT: CPT

## 2021-01-04 PROCEDURE — 96374 THER/PROPH/DIAG INJ IV PUSH: CPT

## 2021-01-04 PROCEDURE — 8E0ZXY6 ISOLATION: ICD-10-PCS | Performed by: INTERNAL MEDICINE

## 2021-01-04 PROCEDURE — 71275 CT ANGIOGRAPHY CHEST: CPT

## 2021-01-04 PROCEDURE — 93005 ELECTROCARDIOGRAM TRACING: CPT

## 2021-01-04 PROCEDURE — 80053 COMPREHEN METABOLIC PANEL: CPT

## 2021-01-04 PROCEDURE — 80048 BASIC METABOLIC PNL TOTAL CA: CPT

## 2021-01-04 PROCEDURE — 36415 COLL VENOUS BLD VENIPUNCTURE: CPT

## 2021-01-04 PROCEDURE — 83605 ASSAY OF LACTIC ACID: CPT

## 2021-01-04 PROCEDURE — 87040 BLOOD CULTURE FOR BACTERIA: CPT

## 2021-01-04 PROCEDURE — 0240U: CPT

## 2021-01-04 PROCEDURE — 71045 X-RAY EXAM CHEST 1 VIEW: CPT

## 2021-01-04 PROCEDURE — 84484 ASSAY OF TROPONIN QUANT: CPT

## 2021-01-04 NOTE — RAD
EXAM:

XR Chest 1 View Portable



PROVIDED CLINICAL HISTORY:

Shortness of breath



COMPARISON:

6/10/2019



FINDINGS:

Cardiac and mediastinal silhouettes is within normal limits for portable technique. Vascular calcific
ation is seen involving the aortic arch. Prominence of the pulmonary interstitium is

redemonstrated. Bibasilar subsegmental atelectatic changes are seen. No focal consolidation, pleural 
fluid or pneumothorax evident.



IMPRESSION:

No evidence for focal consolidation.



Reported By: Fran Calero 

Electronically Signed:  1/4/2021 12:03 PM

## 2021-01-04 NOTE — CT
CT arteriogram chest with IV contrast and 3-D imaging



HISTORY: Dyspnea.



COMPARISON: 1/17/2019.



FINDINGS: There is good contrast opacification of the pulmonary arteries and thoracic aorta with norm
al branching of the great vessels at the aortic arch. Prominent arterial calcification.



Lungs are hyperinflated with scattered areas of parenchymal scarring. Small peripheral focus of groun
dglass infiltrate now lies at the far anterior aspect of the right upper lobe, abutting the pleura.

Of doubtful clinical significance. No lobar consolidation.



Tiny nonspecific subpleural nodules within the left upper lobe are stable.



Reactive appearing lymph nodes are scattered about the mediastinum, measuring up to 2.2 cm x 1.0 cm g
reatest diameters at the subcarinal level.



Calcified granulomata of the abdomen are consistent with healed granulomatous disease. Old right rib 
fractures noted.



  



IMPRESSION :

No evidence of pulmonary embolus.



Reported By: ROXANE Kamara 

Electronically Signed:  1/4/2021 1:46 PM

## 2021-01-05 LAB
ANION GAP SERPL CALC-SCNC: 16 MMOL/L (ref 10–20)
BUN SERPL-MCNC: 20 MG/DL (ref 9.8–20.1)
CALCIUM SERPL-MCNC: 7.8 MG/DL (ref 7.8–10.44)
CHLORIDE SERPL-SCNC: 107 MMOL/L (ref 98–107)
CO2 SERPL-SCNC: 19 MMOL/L (ref 23–31)
CREAT CL PREDICTED SERPL C-G-VRATE: 39 ML/MIN (ref 70–130)
GLUCOSE SERPL-MCNC: 131 MG/DL (ref 83–110)
HGB BLD-MCNC: 12.9 G/DL (ref 12–16)
MCH RBC QN AUTO: 28 PG (ref 27–31)
MCV RBC AUTO: 86.6 FL (ref 78–98)
MDIFF COMPLETE?: YES
PLATELET # BLD AUTO: 128 THOU/UL (ref 130–400)
POTASSIUM SERPL-SCNC: 3.7 MMOL/L (ref 3.5–5.1)
RBC # BLD AUTO: 4.61 MILL/UL (ref 4.2–5.4)
SODIUM SERPL-SCNC: 138 MMOL/L (ref 136–145)
WBC # BLD AUTO: 3.5 THOU/UL (ref 4.8–10.8)

## 2021-01-05 RX ADMIN — ASPIRIN SCH MG: 81 TABLET ORAL at 08:53

## 2021-01-05 RX ADMIN — Medication SCH UNITS: at 08:51

## 2021-01-05 NOTE — PRG
DATE OF SERVICE:  01/05/2021



SUBJECTIVE:  Ms. Jensen is resting in bed.  She is upset that she cannot get up on

her own.  I advised her that it is for her own benefit and safety.  She denies any

chest pain or shortness of breath. 



OBJECTIVE:  VITAL SIGNS:  She is afebrile.  Heart rate 76, respirations 20, oxygen

saturation 93% on 3 L, blood pressure 129/61. 

CARDIOVASCULAR:  S1, S2 plus. 

RESPIRATORY:  Normal vesicular breath sounds with occasional rhonchi. 

ABDOMEN:  Soft, nontender.  Bowel sounds heard in all quadrants. 

EXTREMITIES:  Without cyanosis or clubbing. 

CENTRAL NERVOUS SYSTEM:  Generalized weakness, otherwise nonfocal.



IMPRESSION:  

1. Recent COVID-19 infection.

2. Resolved diarrhea.

3. Coronary artery disease.

4. Hypertension.

5. Dyslipidemia.

6. Hypothyroidism.

7. Deconditioning.



PLAN:  

1. Continue current medications.

2. Heart healthy diet.

3. DVT prophylaxis with Lovenox.

4. Decubitus precautions.

5. Stress ulcer prophylaxis.

6. Monitor respiratory status.

7. She has been switched to Combivent for some reason.  She is on DuoNeb at home 

and she has end-stage COPD.  We will switch her back to DuoNebs.  PT/OT eval and

treat. 







Job ID:  [id number]

## 2021-01-05 NOTE — HP
CHIEF COMPLAINT:  Cough and shortness of breath with COVID positive by RT-PCR.



BRIEF HISTORY:  This is an 88-year-old female with history of chronic obstructive

pulmonary disease and chronic hypoxemic respiratory failure, on home oxygen,

presented to the emergency room because she was feeling weak with shortness of

breath and diarrhea.  Her laboratory values showed a normal lactic acid level, but

an elevated D-dimer.  CTA lungs did not show any pulmonary emboli, but showed

peripheral localized ground-glass infiltrate.  She had a rapid COVID RT-PCR done

which turned out to be positive.  Her influenza screen was negative.  She lives

alone at home and it was decided to admit her to the hospital for close monitoring

and management.  The patient stated that she was weak and she was having trouble

getting out of bed.  She was maintaining her oxygen saturation on her baseline

oxygen of 2 L via nasal cannula.  Denies any myalgia.  She was admitted to the

hospital with Decadron 6 mg daily, vitamin D 2000 international units daily, Zinc

220 mg b.i.d., and Lovenox 40 mg daily along with her home medications. 



PAST MEDICAL HISTORY:  

1. Chronic obstructive pulmonary disease, likely GOLD stage 3 to 4.

2. Chronic hypoxemic respiratory failure.

3. Hypertension.

4. Dyslipidemia.

5. Coronary artery disease.

6. Hypothyroidism.



PAST SURGICAL HISTORY:  

1. Cholecystectomy.

2. Benign right breast mass removal.



FAMILY HISTORY:  Positive for coronary artery disease in both her parents.



PSYCHOSOCIAL HISTORY:  Long-standing history of smoking.  She has more than a

50-pack-year history of smoking.  She quit in 2016.  Denies any alcohol or

recreational drug abuse.  She lives at home alone and is active and independent. 



ALLERGIES:  NO KNOWN DRUG ALLERGIES.



MEDICATIONS:  Her current med list shows that she is on;

1. Norvasc 10 mg daily.

2. Ecotrin 81 mg daily.

3. Tenormin 25 mg daily.

4. Lipitor 80 mg daily.

5. Vitamin D3 2000 units daily.

6. Levoxyl 88 mcg daily.

7. Lisinopril 10 mg daily.

8. Trelegy Ellipta one inhalation daily.

9. Tramadol 50 mg q.8 p.r.n.

10. She is also on Combivent q.i.d., because at home she was taking the DuoNeb.  We

will discontinue Combivent and place her on DuoNeb. 



REVIEW OF SYSTEMS:  CARDIOVASCULAR:  Denies any chest pain, paroxysmal nocturnal

dyspnea, orthopnea, or palpitations. 

RESPIRATORY SYSTEM:  Occasional cough.  Denies any expectoration.  Denies any

pleuritic-type chest pain.  Denies any hemoptysis. 

GASTROINTESTINAL SYSTEM:  Diarrhea a couple of episodes, but no hematemesis, melena,

or hematochezia.  No nausea or vomiting. 

GENITOURINARY SYSTEM:  Denies any frequency, urgency, dysuria, hematuria. 

CENTRAL NERVOUS SYSTEM:  Generalized weakness.  No fainting spells.  No seizure

activity. 

EXTREMITIES:  Frequent joint pain. 

ENT:  Denies any changes with speech, vision, hearing, or swallowing. 

SKIN:  Denies any rash.



PHYSICAL EXAMINATION:

GENERAL:  Pleasant 88-year-old  female, who is resting in bed in the

emergency room.  Denies any complaints. 

VITAL SIGNS:  She is afebrile.  She has a T-max of 100.1, heart rate 87,

respirations 19, oxygen saturation 98% on 2 L, blood pressure 140/65. 

HEENT:  Normocephalic, atraumatic.  Pupils equally reactive to light and

accommodation.  No JVD, thyromegaly, cervical adenopathy, or throat exudates.  No

carotid bruits. 

CARDIOVASCULAR SYSTEM:  S1 and S2 plus.  Rate and rhythm regular. 

RESPIRATORY SYSTEM:  Normal vesicular breath sounds heard in all lung fields.

Prolonged expiratory phase and scattered rhonchi. 

ABDOMEN:  Soft, nontender.  Bowel sounds heard in all quadrants. 

EXTREMITIES:  Without cyanosis or clubbing. 

CENTRAL NERVOUS SYSTEM : Generalized weakness, otherwise nonfocal.



LABORATORY VALUES:  Show a white count of 5.9, H and H are 13.5 and 43.7, platelet

count is slightly low at 125, 79.2% neutrophils with decreased lymphocytes.  Sodium

136, potassium 3.8, BUN and creatinine are 21 and 0.9.  AST and ALT are 26 and 18.

D-dimer was elevated at 1.11. 



IMPRESSION:  

1. COVID-19 positive.

2. Chronic obstructive pulmonary disease.

3. Chronic hypoxemic respiratory failure.

4. Coronary artery disease.

5. Hypertension.

6. Dyslipidemia.

7. Hypothyroidism.

8. Depression and anxiety.



PLAN:  

1. Admit to hospital as inpatient.

2. Respiratory droplet precautions.

3. Start Decadron 6 mg daily, vitamin D3 2000 international units daily, and zinc

220 mg b.i.d. 

4. Lovenox 40 mg daily.

5. Heart healthy diet.

6. Monitor respiratory status and breathing treatments as needed.

7. DVT prophylaxis-she will be on Lovenox.

8. Decubitus precautions.

9. Recheck CBC and BMP in the morning.

10. Resume home medications.

11. Heart healthy diet.

12. Discussed with the patient in detail.  All questions answered.







Job ID:  [id number]

## 2021-01-06 RX ADMIN — Medication SCH UNITS: at 09:35

## 2021-01-06 RX ADMIN — ASPIRIN SCH MG: 81 TABLET ORAL at 09:34

## 2021-01-06 NOTE — PRG
DATE OF SERVICE:  01/06/2021



SUBJECTIVE:  Ms. Jensen is participating with therapy.  Therapy feels that she is

stable enough to be taken off fall precautions and bed alarm.  The patient states

she is now constipated and she normally takes MiraLAX at home, so we will get her

back on her MiraLAX.  She denies any respiratory difficulty.  No fever or chills. 



OBJECTIVE:  VITAL SIGNS:  She is afebrile.  Heart rate 82, respirations 18, oxygen

saturation 94% on 3 L, blood pressure this morning before her medicines was 171/75.

I do not see any new ones documented, and I asked nursing to recheck and let me know

if it is still more than 140/90. 

CARDIOVASCULAR SYSTEM:  S1 and S2 plus. 

RESPIRATORY SYSTEM:  Normal vesicular breath sounds. 

ABDOMEN:  Soft, nontender.  Bowel sounds heard in all quadrants. 

EXTREMITIES:  Without cyanosis or clubbing. 

CENTRAL NERVOUS SYSTEM:  Generalized weakness, otherwise nonfocal.



IMPRESSION:  

1. COVID infection.

2. Chronic obstructive pulmonary disease.

3. Chronic hypoxemic respiratory failure.

4. Hypertension.

5. Dyslipidemia.

6. Hypothyroidism.

7. Deconditioning.



PLAN:  

1. Continue current medications.

2. Resume MiraLAX 17 g in 8 ounces of water daily.

3. Combivent as needed.

4. COVID precautions.

5. Physical therapy.

6. Home medications.

7. Routine laboratory values.

8. Dr. Hernandez on-call from this evening.







Job ID:  [id number]

## 2021-01-07 RX ADMIN — Medication SCH UNITS: at 09:08

## 2021-01-07 RX ADMIN — ASPIRIN SCH MG: 81 TABLET ORAL at 09:06

## 2021-01-08 VITALS — DIASTOLIC BLOOD PRESSURE: 70 MMHG | SYSTOLIC BLOOD PRESSURE: 156 MMHG

## 2021-01-08 VITALS — TEMPERATURE: 97.7 F

## 2021-01-08 RX ADMIN — Medication SCH UNITS: at 08:41

## 2021-01-08 RX ADMIN — ASPIRIN SCH MG: 81 TABLET ORAL at 08:40

## 2021-01-08 NOTE — PRG
DATE OF SERVICE:  01/08/2021



Patient of Dr. Sonny Carroll.



SUBJECTIVE:  The patient is having increased dyspnea, nausea, production of phlegm,

and the significant desaturation on ambulation to the bathroom.  Has had her O2 sats

decreased to 93%, requiring 6 L of cannula and is being placed on a non-rebreather.

Lungs show crackles in the bases.  Respiratory rate increases to 30 with any

exercise.  She is not eating.  States she is feeling worse. 



OBJECTIVE:  LUNGS:  Show crackles in the bases. 

CARDIAC:  Shows rapid regular rhythm to 112.  Respirations ranged from 20 to 30. 

ABDOMEN:  Soft, nontender.



ASSESSMENT:  COVID-19 pneumonia with increasing respiratory failure and impending

decompensation, provide treatment with Decadron in a patient with a history of

chronic hypoxemia and chronic obstructive pulmonary disease. 



PLAN:  Urgently transfer to [facility] as soon as bed is

available.  Maintain one-to-one nursing until that time.  Increase oxygen supply to

non-rebreather, which is the highest we have available here.  Continue to inhale

Combivent 4 times daily. 







Job ID:  [id number]

## 2021-01-08 NOTE — RAD
XR Chest 1 View Portable



HISTORY: Dyspnea.Covid 19 Hypoxemia



COMPARISON: 1/4/2021



FINDINGS: The heart size is normal. There is tortuous. There is mild prominence of the pulmonary vasc
ularity. The lungs are well expanded without focal areas of consolidation, pneumothorax or pleural

effusions.



IMPRESSION: No evidence of lobar pneumonia



Reported By: Teja Ricci 

Electronically Signed:  1/8/2021 11:52 AM

## 2021-01-08 NOTE — PRG
DATE OF SERVICE:  01/07/2021



Patient of Dr. Sonny Carroll.



SUBJECTIVE:  The patient feels weak with malaise, but is having no fever, chills,

cough.  Still complaining of some constipation, but has begun on her MiraLAX.  She

is having no respiratory distress, but does not feel well. 



OBJECTIVE:  VITAL SIGNS:  Show a temperature of 98, pulse 80, respirations 18, O2

sats 94% on 2 L, blood pressure 150/63. 

LUNGS:  Clear. 

CARDIAC:  Showed regular rhythm. 

ABDOMEN:  Soft and nontender. 

SKIN/EXTREMITIES:  Display no edema, clubbing, or cyanosis.



ASSESSMENT:  An 88-year-old white female with a history of COPD and chronic

hypoxemia with requirement of 2 L cannula at home, who was had no change in her

hypoxemia, but because of her risk, she was admitted to the hospital, has been

started on Decadron 6 mg daily, vitamin D 2000 units daily, zinc 220 twice daily,

and Lovenox 40 daily along with her home medications.  She states she feels about

the same with no deterioration in her respiratory status, but no improvement in her

strength.  She is working with therapy and is found to be safe to ambulate in the

isolation room.  She has been maintained on isolation and will be maintained on

isolation for 10 days until she is symptom__________ she will be monitored closely

for deterioration of her cardiopulmonary status.  Her constipation will be monitored

closely that has remained symptom at this time and she will be continued with PT and

OT in the isolation. 







Job ID:  [id number]

## 2021-01-12 NOTE — PQF
CLINICAL DOCUMENTATION CLARIFICATION FORM:





Dear : Malcolm Hernandez MD                                   Date / Time: 
01/12/2021

Please exercise your independent, professional judgment in responding to the 
clarification form. 

Clinical indicators are provided on the bottom of this form for your review



Please check appropriate box(es):

[  ] Acute Respiratory Failure:                        [  ] with Hypoxia   [  ] 
with Hypercapnia

[  ] Acute On Chronic Respiratory Failure:  [  ] with Hypoxia    [  ] with 
Hypercapnia

[  ] Acute Respiratory Failure due to: (etiology) ______________________ 

[  ] ARDS (Acute Respiratory Distress Syndrome)

[  ] Chronic Respiratory Failure only        [  ] with Hypoxia       [  ] with 
Hypercapnia

[  ] Respiratory Insufficiency

[  ] Hypoxia

[  ] Other diagnosis ___________(Please specify if any)

[  ] Unable to determine

In addition, please specify:

Present on Admission (POA):  [  ] Yes             [  ] No             [  ] 
Unable to determine



Physician Signature:                         Date/Time:



For continuity of documentation, please document condition throughout progress 
notes and discharge summary.  Thank You.

To be completed by CDI/Coding staff for physician review: 







 Present Clinical Indicators - Signs / Symptoms / Labs Results and Location in 

Medical Record

 

[  ] ABG pH < 7.35 or > 7.45 

 

[  ] Decreased oxygen saturation (<90% room air or < 95% on oxygen). 
Cyanosis/Hypoxia 

 

[  ] PCO2 > 50 mm Hg (PCO2 findings of 10-15 mm Hg above the patient's normal 
level if patient has COPD) 

 

[  ] PO2 < 60 mm Hg (PCO2 findings of 10-15 mm Hg below the patient's normal 
level if patient has COPD) 

 

[  ] Labored or rapid respirations (use of accessory 

muscles or inability to speak full sentences, air hunger) 

 

[x] Chronic hypoxic respiratory failure on home oxygen H&P on 01/05

 

[x] She states she feels about the same with no deterioration in her respiratory
 status Progress notes on 01/07

 

[x] Has had her O2 sats decreased to 93% requiring 6Lof cannula& being placed on
 non rebreather Progress notes on 01/08

 

[x] COVID pneumonia with increasing respiratory failure & impending 
decomepnsation Progress notes on 01/08

 

[  ] Bilateral opacities in CXR/CT Chest 

 

[  ] Respiratory symptoms within one week of known clinical insult (not due to 
CHF or fluid overload) 

 

Present Risk Factors                                                            
             Results and Location in Medical Record

 

[x] History of home O2 use H&P on 01/05

 

[  ] Recent surgery 

 

[  ] Chest trauma 

 

[x] COPD exacerbation, COVID positive H&P on 01/05

 

[  ] CHF exacerbation 

 

[  ] Tobacco abuse / exposure 

 

[  ] Pneumonia 

 

[  ] CVA 

 

[  ] AMI 

 

Present Treatments                                                              
               Results and Location in Medical Record

 

[x] Oxygen 3L O flow rate on 01/05

 

[x] Monitoring of respiratory status Progress notes on 01/05

 

[  ] Mechanical ventilation / BiPAP 

 

[x] Increased oxygen supply to non-breather which is highest we have availabe 
here Progress notes on 01/08

 

[  ] Serial CXR 

 

[  ] ABGs 

 

[  ] Antibiotics IV 

 

[  ] Bronchodilators 

 

[  ] Diuresis 

 

[  ] Pulmonary Consult 

 

[  ] ICU/Stepdown 





CDS/ Signature: RENEE                     Phone #: _____________        
Date/Time: 01/12/2021





Acute Respiratory Failure:  ABG pH < 7.35 or > 7.45; Decreased oxygen saturation
(<90% room air or < 95% on oxygen); PCO2 > 50 mm Hg; PO2 < 60 mm Hg; Labored or 
rapid respirations



ARDS: Dx Criteria [[place] ARDS]: Respiratory symptoms within one week of a known
clinical insult (e.g. shock, infection, surgery, trauma)   Bilateral opacities 
in CXR/Chest CT not due to CHF or fluid

                 This is a permanent part of the Medical Record

MTDD

## 2021-01-18 ENCOUNTER — HOSPITAL ENCOUNTER (OUTPATIENT)
Dept: HOSPITAL 18 - NAV ERS | Age: 86
Setting detail: OBSERVATION
LOS: 2 days | Discharge: NURSING, REHAB OR LONG-TERM CARE | End: 2021-01-20
Attending: INTERNAL MEDICINE | Admitting: INTERNAL MEDICINE
Payer: MEDICARE

## 2021-01-18 VITALS — BODY MASS INDEX: 21.2 KG/M2

## 2021-01-18 DIAGNOSIS — E03.9: ICD-10-CM

## 2021-01-18 DIAGNOSIS — F41.9: ICD-10-CM

## 2021-01-18 DIAGNOSIS — Z79.899: ICD-10-CM

## 2021-01-18 DIAGNOSIS — Z79.82: ICD-10-CM

## 2021-01-18 DIAGNOSIS — Z79.2: ICD-10-CM

## 2021-01-18 DIAGNOSIS — Z95.5: ICD-10-CM

## 2021-01-18 DIAGNOSIS — J96.21: ICD-10-CM

## 2021-01-18 DIAGNOSIS — Z86.16: ICD-10-CM

## 2021-01-18 DIAGNOSIS — I10: ICD-10-CM

## 2021-01-18 DIAGNOSIS — F32.9: ICD-10-CM

## 2021-01-18 DIAGNOSIS — E78.5: ICD-10-CM

## 2021-01-18 DIAGNOSIS — Z66: ICD-10-CM

## 2021-01-18 DIAGNOSIS — Z87.891: ICD-10-CM

## 2021-01-18 DIAGNOSIS — I25.10: ICD-10-CM

## 2021-01-18 DIAGNOSIS — Z99.81: ICD-10-CM

## 2021-01-18 DIAGNOSIS — J44.9: Primary | ICD-10-CM

## 2021-01-18 LAB
ALBUMIN SERPL BCG-MCNC: 3 G/DL (ref 3.4–4.8)
ALP SERPL-CCNC: 57 U/L (ref 40–110)
ALT SERPL W P-5'-P-CCNC: 31 U/L (ref 8–55)
ANION GAP SERPL CALC-SCNC: 18 MMOL/L (ref 10–20)
AST SERPL-CCNC: 22 U/L (ref 5–34)
BASOPHILS # BLD AUTO: 0 THOU/UL (ref 0–0.2)
BASOPHILS NFR BLD AUTO: 0.1 % (ref 0–1)
BILIRUB SERPL-MCNC: 0.7 MG/DL (ref 0.2–1.2)
BUN SERPL-MCNC: 17 MG/DL (ref 9.8–20.1)
CALCIUM SERPL-MCNC: 7.9 MG/DL (ref 7.8–10.44)
CHLORIDE SERPL-SCNC: 105 MMOL/L (ref 98–107)
CK SERPL-CCNC: 31 U/L (ref 29–168)
CO2 SERPL-SCNC: 20 MMOL/L (ref 23–31)
CREAT CL PREDICTED SERPL C-G-VRATE: 0 ML/MIN (ref 70–130)
EOSINOPHIL # BLD AUTO: 0 THOU/UL (ref 0–0.7)
EOSINOPHIL NFR BLD AUTO: 0 % (ref 0–10)
GLOBULIN SER CALC-MCNC: 3.1 G/DL (ref 2.4–3.5)
GLUCOSE SERPL-MCNC: 136 MG/DL (ref 83–110)
HGB BLD-MCNC: 13.2 G/DL (ref 12–16)
LYMPHOCYTES # BLD AUTO: 0.8 THOU/UL (ref 1.2–3.4)
LYMPHOCYTES NFR BLD AUTO: 4.8 % (ref 21–51)
MCH RBC QN AUTO: 27.5 PG (ref 27–31)
MCV RBC AUTO: 87.2 FL (ref 78–98)
MONOCYTES # BLD AUTO: 0.3 THOU/UL (ref 0.11–0.59)
MONOCYTES NFR BLD AUTO: 1.5 % (ref 0–10)
NEUTROPHILS # BLD AUTO: 15.4 THOU/UL (ref 1.4–6.5)
NEUTROPHILS NFR BLD AUTO: 93.7 % (ref 42–75)
PLATELET # BLD AUTO: 271 THOU/UL (ref 130–400)
POTASSIUM SERPL-SCNC: 3.6 MMOL/L (ref 3.5–5.1)
RBC # BLD AUTO: 4.79 MILL/UL (ref 4.2–5.4)
SODIUM SERPL-SCNC: 139 MMOL/L (ref 136–145)
WBC # BLD AUTO: 16.4 THOU/UL (ref 4.8–10.8)

## 2021-01-18 PROCEDURE — 82550 ASSAY OF CK (CPK): CPT

## 2021-01-18 PROCEDURE — 85025 COMPLETE CBC W/AUTO DIFF WBC: CPT

## 2021-01-18 PROCEDURE — 71045 X-RAY EXAM CHEST 1 VIEW: CPT

## 2021-01-18 PROCEDURE — 84484 ASSAY OF TROPONIN QUANT: CPT

## 2021-01-18 PROCEDURE — 93005 ELECTROCARDIOGRAM TRACING: CPT

## 2021-01-18 PROCEDURE — G0378 HOSPITAL OBSERVATION PER HR: HCPCS

## 2021-01-18 PROCEDURE — 80053 COMPREHEN METABOLIC PANEL: CPT

## 2021-01-18 PROCEDURE — 94640 AIRWAY INHALATION TREATMENT: CPT

## 2021-01-18 NOTE — RAD
CHEST 1 VIEW:

 

Date:  01/18/2021

 

HISTORY:  

Shortness of breath. 

 

COMPARISON:  

Radiograph dated 01/13/2021. 

 

FINDINGS:

Similar bibasilar air space opacities. No pneumothorax. Heart size is similar. No acute osseous abnor
mality. 

 

IMPRESSION: 

Similar examination of the chest with multifocal pneumonia. 

 

 

POS: [facility]

## 2021-01-19 RX ADMIN — ASPIRIN SCH MG: 81 TABLET ORAL at 08:05

## 2021-01-19 NOTE — PRG
DATE OF SERVICE:  01/19/2021



SUBJECTIVE:  Ms. Jensen is resting in bed and stating that she is having difficulty

breathing.  She apparently did not bring her trilogy with her.  She is on Combivent

and feels like it is not helping.  She is maintaining her oxygen saturation.  Plan

is to switch her to DuoNeb and see how she does.  If she does not improve with the

DuoNeb, then may need to get her on higher dose steroids as she does have at least

GOLD stage 3 COPD.  Her daughter is waiting for the DME company to show up and see

if there is any issues with the oxygen concentrator at home.  I was hoping to

discharge her today, but it may need to wait until tomorrow. 



OBJECTIVE:  VITAL SIGNS:  The patient is afebrile.  Heart rate 73, respirations 18,

oxygen saturation 95% on 3 L nasal cannula, blood pressure 110/67. 

CARDIOVASCULAR SYSTEM:  S1 and S2 plus. 

RESPIRATORY SYSTEM:  Normal vesicular breath sounds with occasional wheeze. 

ABDOMEN:  Soft and nontender.  Bowel sounds heard in all quadrants. 

EXTREMITIES:  Without cyanosis or clubbing. 

CENTRAL NERVOUS SYSTEM:  Generalized weakness, otherwise nonfocal.



IMPRESSION:  

1. Chronic obstructive pulmonary disease with possible exacerbation.

2. Recent COVID-19 infection.

3. Hypertension.

4. Dyslipidemia.

5. Hypothyroidism.

6. Coronary artery disease.

7. Anxiety and depression.

8. Chronic hypoxemic respiratory failure.



PLAN:  

1. Change Combivent to DuoNeb q.4 while awake.

2. Continue trilogy.  Advised the patient that if she does not have it here to have

her daughter bring it. 

3. Continue other medications.

4. Heart healthy diet.

5. Monitor respiratory status.

6. DVT prophylaxis with PlexiPulses.

7. Activity as tolerated.

8. Decubitus precautions.

9. Stress ulcer prophylaxis.

10. Anticipate discharging her to home once her breathing is back to baseline and

her oxygen concentrator has been evaluated.  The patient also states that currently

her nebulizer is not working and apparently they are delivering a new one in, so we

will make sure that is available as well. 







Job ID:  [id number]

## 2021-01-20 ENCOUNTER — HOSPITAL ENCOUNTER (INPATIENT)
Dept: HOSPITAL 18 - NAV ACUTE | Age: 86
LOS: 9 days | Discharge: HOME HEALTH | DRG: 189 | End: 2021-01-29
Attending: INTERNAL MEDICINE | Admitting: INTERNAL MEDICINE
Payer: MEDICARE

## 2021-01-20 VITALS — SYSTOLIC BLOOD PRESSURE: 97 MMHG | DIASTOLIC BLOOD PRESSURE: 57 MMHG | TEMPERATURE: 97.8 F

## 2021-01-20 VITALS — BODY MASS INDEX: 19.5 KG/M2

## 2021-01-20 DIAGNOSIS — M19.90: ICD-10-CM

## 2021-01-20 DIAGNOSIS — J44.1: ICD-10-CM

## 2021-01-20 DIAGNOSIS — Z79.51: ICD-10-CM

## 2021-01-20 DIAGNOSIS — E78.5: ICD-10-CM

## 2021-01-20 DIAGNOSIS — F41.9: ICD-10-CM

## 2021-01-20 DIAGNOSIS — I25.10: ICD-10-CM

## 2021-01-20 DIAGNOSIS — F32.9: ICD-10-CM

## 2021-01-20 DIAGNOSIS — I10: ICD-10-CM

## 2021-01-20 DIAGNOSIS — J96.21: Primary | ICD-10-CM

## 2021-01-20 DIAGNOSIS — Z79.899: ICD-10-CM

## 2021-01-20 DIAGNOSIS — Z87.891: ICD-10-CM

## 2021-01-20 DIAGNOSIS — E03.9: ICD-10-CM

## 2021-01-20 DIAGNOSIS — Z79.890: ICD-10-CM

## 2021-01-20 PROCEDURE — 80048 BASIC METABOLIC PNL TOTAL CA: CPT

## 2021-01-20 PROCEDURE — 85025 COMPLETE CBC W/AUTO DIFF WBC: CPT

## 2021-01-20 RX ADMIN — ASPIRIN SCH MG: 81 TABLET ORAL at 10:47

## 2021-01-21 RX ADMIN — ASPIRIN SCH MG: 81 TABLET ORAL at 08:37

## 2021-01-21 NOTE — SS
DATE OF ADMISSION:  01/18/2021



DATE OF DISCHARGE:  01/20/2021



CHIEF COMPLAINT:  Shortness of breath.



BRIEF HISTORY:  This is a pleasant 88-year-old female who was admitted to [facility] with COVID-19 and worsening shortness of breath.  She was treated

appropriately and was discharged home on 01/15.  Apparently, patient noticed

significant shortness of breath and was unable to keep her oxygen saturation up even

with her oxygen at home turned all the way up, so she present to the emergency room.

Here while on 3 L of oxygen her saturation has remained 98%.  She normally is on 3 L

of oxygen, so it seems like there may be a problem with either her tubing or her

concentrator.  Plan is to admit her to the hospital under observation.  Arrangements

have been made by daughter to have the DME company come by and check on her oxygen

concentrator tomorrow and if everything is sorted out, then we will discharge her

tomorrow.  The patient currently states that she is breathing much easier and is

pretty much back to her baseline.  She is supposed to be taking doxycycline for 7

days from the 15th, dexamethasone 6 mg daily and vitamin C 1000 mg daily for a

month.  She is also on aspirin 81 daily.  I am not sure whether the current med list

is accurate.  They do not have the right medications.  We will talk to the nursing

and get that sorted out. 



PAST MEDICAL HISTORY:  

1. Hypertension.

2. Dyslipidemia.

3. Hypothyroidism.

4. Chronic obstructive pulmonary disease.

5. Chronic hypoxemic respiratory failure.

6. Coronary artery disease.

7. Anxiety.

8. Depression.



PAST SURGICAL HISTORY:  

1. Cholecystectomy.

2. Benign right breast mass removal.



FAMILY HISTORY:  Positive for coronary artery disease.



PSYCHOSOCIAL HISTORY:  She has more than 50-pack-year history of smoking.  She quit

in 2016.  Denies any alcohol or recreational drug abuse.  She lives at home alone

and is active and independent. 



ALLERGIES:  NO KNOWN DRUG ALLERGIES.



MEDICATIONS:  When she was discharged home from here, she was supposed to be on:

1. Norvasc 10 mg daily.

2. Ecotrin 81 mg daily.

3. Tenormin 25 mg daily. 

4. Lipitor 80 mg daily.

5. Levoxyl 88 mcg daily.

6. Lisinopril 10 mg daily.

7. Trelegy Ellipta one inhalation daily.

8. Vitamin D3 2000 international units daily.

9. Tramadol 50 mg q.8h p.r.n.

10. DuoNeb q.i.d. p.r.n. 



When she was discharged from the formerly [facility] she was on:

1. Doxycycline 100 mg b.i.d. for seven days.

2. Dexamethasone 6 mg daily for 14 days.

3. Vitamin C 1000 mg daily for 30 days.



REVIEW OF SYSTEMS:  CARDIOVASCULAR SYSTEM: Denies any chest pain, baseline shortness

of breath.  Denies any palpitations, PND, orthopnea, pedal edema. 

RESPIRATORY SYSTEM: Denies occasional cough. Rare expectoration.  Denies any

hemoptysis, improved shortness of breath. 

GASTROINTESTINAL SYSTEM: Denies any nausea, vomiting, constipation, hematemesis,

melena, hematochezia. 

GENITOURINARY SYSTEM: Denies any frequency, urgency, dysuria, hematuria. 

CENTRAL NERVOUS SYSTEM: Denies any focal numbness, weakness, fainting spells. 

ENT: Denies any changes with speech, vision, hearing, or swallowing. 

EXTREMITIES: Occasional joint pain. 

SKIN: Denies any rash. 

CENTRAL NERVOUS SYSTEM: Denies any focal numbness, weakness, or fainting spells.



PHYSICAL EXAMINATION:

GENERAL: A pleasant 88-year-old  female, who is up in bed and denies any

concerns.  She is awake, alert, and oriented x3. 

VITAL SIGNS: She is afebrile.  Heart rate 79, respirations 20, oxygen saturation 99%

on 3 L, and blood pressure is 125/85. 

CARDIOVASCULAR:  S1-S2 plus. 

RESPIRATORY SYSTEM:  Normal vesicular breath sounds with occasional rhonchi. 

ABDOMEN:  Soft, nontender. Bowel sound heard in all quadrants. 

EXTREMITIES:  Without cyanosis, clubbing. 

CENTRAL NERVOUS SYSTEM: A and 0 x 3.  Cranial nerves 2 through 12 intact.

Generalized weakness, otherwise nonfocal. 



LABORATORY DATE:  Done in the emergency room shows a white count of 16.4, most

likely due to steroids.  H and H are 13.2 and 41.8.  Sodium 139, potassium 3.6, BUN

and creatinine 17 and 0.61. 



IMPRESSION:  

1. Hypoxemia, which has resolved with oxygen here at her baseline level, so probably

some equipment malfunction at home. 

2. Recent COVID-19 infection.

3. Chronic hypoxemic respiratory failure.

4. Chronic obstructive pulmonary disease.

5. Hypertension.

6. Dyslipidemia.

7. Hypothyroidism.

8. Anxiety and depression.

9. Coronary artery disease.



PLAN:  

1. Continue home medications.  

2. Have nursing correct her med list.

3. Combivent q.i.d. p.r.n.

4. Heart healthy diet.

5. Await DME evaluation at home.

6. Activity as tolerated.

7. Anticipate discharge home tomorrow once the cardiac event malfunction is sorted

out. 







Job ID:  [id number]

## 2021-01-21 NOTE — HP
PRINCIPAL DIAGNOSES:  Possible chronic obstructive pulmonary disease exacerbation

and deconditioning for therapy. 



BRIEF HISTORY:  This is a pleasant 88-year-old  female, who was admitted to

the acute bed due to hypoxemia.  This resolved when she was placed on 3 L of oxygen

here.  We felt that this was an issue with her home equipment, so Madison went and

evaluated the home equipment.  Apparently, the patient had a 120-foot hose connected

to the concentrator and it is not equipped to handle more than 25 feet.  Apparently,

initially they had said that they have high capacity concentrator, but when I wrote

the order for high capacity concentrator and a 50-foot hose that is when [facility]

called back and said no.  She only needs 25-foot hose at home and what she has will

handle it and they said she does not qualify for any other equipment.  She was

actually switched to swing bed because she was also noticing some shortness of

breath and weakness.  This morning, her breathing is much better and she states it

is back to baseline.  She also participated with therapy. 



PAST MEDICAL HISTORY:  

1. Coronary artery disease.

2. Hypertension.

3. Dyslipidemia.

4. Hypothyroidism.

5. Chronic obstructive pulmonary disease.

6. Chronic hypoxemic respiratory failure.

7. Osteoarthritis.

8. Depression and anxiety.



MEDICATIONS:  

1. She is on DuoNeb q.4 p.r.n.

2. Norvasc 10 mg at bedtime.

3. Ecotrin 81 mg daily.

4. Tenormin 25 mg daily.

5. Lipitor 80 mg at bedtime.

6. Decadron 4 mg daily for I think six more days.

7. Vibramycin 100 mg b.i.d. for four more days.

8. Synthroid 100 mcg daily.

9. Lisinopril 10 mg daily.

10. Trelegy one puff and gargle after use. 



I am not sure why still on the prednisone, I will discontinue it.



ALLERGIES:  NO KNOWN DRUG ALLERGIES.



FAMILY HISTORY:  Noncontributory to current admission.



PSYCHOSOCIAL HISTORY:  Former smoker, quit about a few years ago.  Denies any

alcohol or recreational drug abuse.  Active and independent. 



REVIEW OF SYSTEMS:  CARDIOVASCULAR SYSTEM:  Denies any chest pain, shortness of

breath, palpitations, PND, orthopnea, pedal edema. 

RESPIRATORY SYSTEM:  Denies any chronic cough, expectoration, pleuritic-type chest

pain, or hemoptysis.  Occasional dyspnea on exertion. 

GASTROINTESTINAL SYSTEM:  Denies any nausea, vomiting, diarrhea, constipation,

hematemesis, melena, hematochezia. 

GENITOURINARY SYSTEM:  Denies any frequency, urgency, dysuria, hematuria. 

CENTRAL NERVOUS SYSTEM:  Denies any focal numbness, weakness, or fainting spells. 

EXTREMITIES:  Does complain of frequent joint pains. 

SKIN:  Denies any rash. 

HEENT:  Denies any changes with speech, vision, hearing, or swallowing.



PHYSICAL EXAMINATION:

GENERAL:  Very pleasant 88-year-old  female, who is up in bed and states

she feels much better.  She is alert, awake, and oriented x3. 

VITAL SIGNS:  She is afebrile.  Heart rate 99; respirations 18; oxygen saturation

93% on 3.5 L; and blood pressure early this morning was 190/80, this is before her

medications and after her medications, it is 122/65. 

HEENT:  Normocephalic, atraumatic.  Pupils equally reacting to light and

accommodation.  Extraocular muscles intact.  No JVD, thyromegaly, cervical

adenopathy, or throat exudates.  No carotid bruits. 

CARDIOVASCULAR SYSTEM:  S1, S2 plus.  Rate and rhythm regular. 

RESPIRATORY SYSTEM:  Normal vesicular breath sounds with decreased air entry in the

bases.  Lungs are clear. 

ABDOMEN:  Soft, nontender.  Bowel sounds heard in all quadrants. 

EXTREMITIES:  Without cyanosis, clubbing. 

CENTRAL NERVOUS SYSTEM:  A, A, and O x3.  Cranial nerves 2 through 12 intact.

Generalized weakness, otherwise nonfocal. 



IMPRESSION:  

1. Chronic obstructive pulmonary disease exacerbation, improving.

2. Chronic hypoxemic respiratory failure.

3. Recent COVID-19 infection.

4. Hypertension.

5. Dyslipidemia.

6. Hypothyroidism.

7. Depression and anxiety.

8. Coronary artery disease and deconditioning.



PLAN:  

1. Continue current medications.

2. Heart-healthy diet.

3. Monitor respiratory status.

4. Breathing treatments as needed.

5. DVT prophylaxis with PlexiPulses.

6. Decubitus precaution.

7. Stress ulcer prophylaxis.

8. PT, OT eval and treat.

9. Routine laboratory values.

10. Anticipate discharge her home in about 3 to 5 days depending upon how will she

improves with therapy and how her respiration does.  Discussed with the patient.

All questions answered. 







Job ID:  [id number]

## 2021-01-21 NOTE — DIS
DATE OF ADMISSION:  01/18/2021



DATE OF DISCHARGE:  01/20/2021



PRINCIPAL DIAGNOSIS:  Chronic obstructive pulmonary disease with possible

exacerbation. 



SECONDARY DIAGNOSES:  

1. Recent COVID-19 infection.

2. Acute on chronic hypoxemic respiratory failure.

3. Hypertension.

4. Dyslipidemia.

5. Hypothyroidism.

6. Coronary artery disease.

7. Anxiety and depression.

8. Deconditioning.



COMPLICATIONS:  None.



ADVERSE REACTIONS:  None.



PROCEDURES:  None.



CONSULTATIONS:  None.



HOSPITAL COURSE:  The patient was admitted after she presented to the hospital with

worsening shortness of breath.  She apparently tried to increase her oxygen at her

home, but did not notice any improvement and her maximum oxygen saturation was 91%.

When she came to the ER, she was placed on 3 L of oxygen and her oxygen saturation

was up to 98%.  Suspicion is that there may be something wrong with her oxygen

concentrator at home, so she was admitted overnight for observation.  She did well

the first day, but then the second day, she started complaining of shortness of

breath and her oxygen needed to be increased to 4 L.  I changed her from Combivent

inhaler to DuoNeb via nebulizer and that seemed to help a little.  Spoke with her

daughter, who had the DME company come by and evaluate the concentrator and

apparently the patient has a 50-foot oxygen hose connected to her concentrator which

is capable only of a 40-foot hose to push the 3 L, so she needs a high capacity

concentrator which I will order and she is also having worsening shortness of

breath.  Plan is to transfer her to skilled unit, continue therapy, and monitor her

respiratory status.  She is still on Decadron.  This morning, her lungs did sound

better even though the patient still felt pretty weak and that is another reason to

switch her to swing bed and consult therapy. 



PHYSICAL EXAMINATION:

VITAL SIGNS:  On the day of discharge, the patient is afebrile, heart rate 87,

respirations 18, oxygen saturation 94% on 4 L, and blood pressure is 106/59. 

CARDIOVASCULAR SYSTEM:  S1, S2 plus. 

RESPIRATORY SYSTEM:  Normal vesicular breath sounds. 

ABDOMEN:  Soft, nontender.  Bowel sounds heard in all quadrants. 

EXTREMITIES:  Without cyanosis or clubbing. 

CENTRAL NERVOUS SYSTEM:  Generalized weakness.



MEDICATIONS:  

1. Norvasc 10 mg daily.

2. Ecotrin 81 mg daily.

3. Tenormin 25 mg daily.

4. Lipitor 80 mg at bedtime.

5. Decadron 4 mg in the morning.

6. Vibramycin 100 mg b.i.d. for four more days.

7. Synthroid 100 mcg in the morning.

8. Lisinopril 10 mg daily.

9. Trelegy inhalation once daily.

10. MiraLAX 17 g in 8 ounces of water daily.



PLAN:  Transfer her to skilled bed on current medications.  Consult PT, OT.  Routine

labs tomorrow.  Heart-healthy diet.  Monitor respiratory status.  For full details,

please see chart. 







Job ID:  [id number]

## 2021-01-22 RX ADMIN — ASPIRIN SCH MG: 81 TABLET ORAL at 08:22

## 2021-01-22 NOTE — PRG
DATE OF SERVICE:  01/22/2021



SUBJECTIVE:  Ms. Jensen is doing well.  She is up in her chair.  She states that her

breathing continues to improve.  She feels like therapy is benefitting her.  I

advised her that her durable medical equipment company, [employer] stated that she does

not need a high capacity concentrator.  She only needs a 25-foot hose and she

apparently got a 120-foot hose connected to it.  It is also apparently a portable

concentrator, so they are not going to deliver any new equipment, but they did

deliver a 23-foot hose. 



OBJECTIVE:  VITAL SIGNS:  The patient is afebrile, heart rate is 83, respirations

20, oxygen saturation 96% on 3.5 L, and blood pressure 140/62. 

CARDIOVASCULAR SYSTEM:  S1 and S2 plus. 

RESPIRATORY SYSTEM:  Normal vesicular breath sounds. 

ABDOMEN:  Soft and nontender.  Bowel sounds heard in all quadrants. 

EXTREMITIES:  Without cyanosis or clubbing. 

CENTRAL NERVOUS SYSTEM:  Generalized weakness, otherwise nonfocal.



IMPRESSION:  

1. Chronic obstructive pulmonary disease.

2. Chronic hypoxemic respiratory failure, acute on chronic, improving.

3. Recent COVID-19 infection.

4. Hypertension.

5. Dyslipidemia.

6. Hypothyroidism.

7. Coronary artery disease.

8. Anxiety and depression.



PLAN:  

1. Continue current medications.

2. Continue therapy.

3. Heart-healthy diet.

4. Monitor respiratory status.

5. DVT prophylaxis with PlexiPulses.

6. Decubitus precaution.

7. Stress ulcer prophylaxis.

8. Routine laboratory values.







Job ID:  [id number]

## 2021-01-23 RX ADMIN — ASPIRIN SCH MG: 81 TABLET ORAL at 09:40

## 2021-01-24 RX ADMIN — ASPIRIN SCH MG: 81 TABLET ORAL at 08:39

## 2021-01-24 NOTE — PRG
DATE OF SERVICE:  01/23/2021



SUBJECTIVE:  Ms. Jensen is up in her chair.  She states that her breathing is

improving.  She is happy with her progress.  She denies any questions or concerns.

I did advise her to do some bedside exercises since therapy is not here on the

weekend. 



OBJECTIVE:  VITAL SIGNS:  She is afebrile, heart rate is 86, respirations 19, oxygen

saturation 96% on 3.5 L, blood pressure 154/72. 

CARDIOVASCULAR SYSTEM:  S1 and S2 plus. 

RESPIRATORY SYSTEM:  Normal vesicular breath sounds with prolonged expiratory phase

and occasional wheeze. 

ABDOMEN:  Soft, nontender.  Bowel sounds heard in all quadrants. 

EXTREMITIES:  Without cyanosis or clubbing. 

CENTRAL NERVOUS SYSTEM:  Generalized weakness.  Otherwise, nonfocal.



IMPRESSION:  

1. Recent COVID-19 infection.

2. Hypoxemia, which resolved with placement of oxygen here, likely due to

dysfunction of her concentrator at home, which has been sorted out. 

3. Possible chronic obstructive pulmonary disease exacerbation, which is improving.

4. Hypertension.

5. Dyslipidemia.

6. Hypothyroidism.

7. Coronary artery disease.

8. Anxiety and depression.



PLAN:  

1. Continue current medications.

2. Heart-healthy diet.

3. Breathing treatments.

4. DVT prophylaxis.

5. Decubitus precautions.

6. Stress ulcer prophylaxis.

7. Titrate oxygen back to her baseline level.

8. Anticipate discharge her home in the next few days.







Job ID:  [id number]

## 2021-01-24 NOTE — PRG
DATE OF SERVICE:  01/24/2021



SUBJECTIVE:  Ms. Jensen is up in her chair.  She states her breathing is pretty much

back to her baseline.  She is now on her baseline level of oxygen, which is 3 L. 



OBJECTIVE:  VITAL SIGNS:  She is afebrile, heart rate 83, respirations 18, oxygen

saturation 94%, blood pressure 107/74. 

CARDIOVASCULAR SYSTEM:  S1 and S2 plus. 

RESPIRATORY SYSTEM:  Normal vesicular breath sounds. 

ABDOMEN:  Soft and nontender.  Bowel sounds heard in all quadrants. 

EXTREMITIES:  Without cyanosis or clubbing. 

CENTRAL NERVOUS SYSTEM:  Improving deconditioning.



IMPRESSION:  

1. Resolved acute on chronic hypoxemic respiratory failure.

2. Recent COVID-19 infection.

3. Resolved chronic obstructive pulmonary disease exacerbation.

4. Chronic obstructive pulmonary disease.

5. Hypertension.

6. Dyslipidemia.

7. Coronary artery disease.

8. Hypothyroidism.

9. Anxiety and depression.

10. Deconditioning.



PLAN:  

1. Continue current medications.

2. Heart healthy diet.

3. Monitor respiratory status.

4. Breathing treatments as needed.

5. Physical therapy.

6. Routine laboratory values.

7. Discharge planning.







Job ID:  [id number]

## 2021-01-25 LAB
ANION GAP SERPL CALC-SCNC: 12 MMOL/L (ref 10–20)
BASOPHILS # BLD AUTO: 0.1 THOU/UL (ref 0–0.2)
BASOPHILS NFR BLD AUTO: 0.7 % (ref 0–1)
BUN SERPL-MCNC: 16 MG/DL (ref 9.8–20.1)
CALCIUM SERPL-MCNC: 8.2 MG/DL (ref 7.8–10.44)
CHLORIDE SERPL-SCNC: 101 MMOL/L (ref 98–107)
CO2 SERPL-SCNC: 27 MMOL/L (ref 23–31)
CREAT CL PREDICTED SERPL C-G-VRATE: 53 ML/MIN (ref 70–130)
EOSINOPHIL # BLD AUTO: 0 THOU/UL (ref 0–0.7)
EOSINOPHIL NFR BLD AUTO: 0.5 % (ref 0–10)
GLUCOSE SERPL-MCNC: 89 MG/DL (ref 83–110)
HGB BLD-MCNC: 12.1 G/DL (ref 12–16)
LYMPHOCYTES # BLD AUTO: 1.6 THOU/UL (ref 1.2–3.4)
LYMPHOCYTES NFR BLD AUTO: 17.8 % (ref 21–51)
MCH RBC QN AUTO: 27.1 PG (ref 27–31)
MCV RBC AUTO: 85.9 FL (ref 78–98)
MONOCYTES # BLD AUTO: 0.4 THOU/UL (ref 0.11–0.59)
MONOCYTES NFR BLD AUTO: 4.9 % (ref 0–10)
NEUTROPHILS # BLD AUTO: 6.6 THOU/UL (ref 1.4–6.5)
NEUTROPHILS NFR BLD AUTO: 76 % (ref 42–75)
PLATELET # BLD AUTO: 186 THOU/UL (ref 130–400)
POTASSIUM SERPL-SCNC: 4.5 MMOL/L (ref 3.5–5.1)
RBC # BLD AUTO: 4.46 MILL/UL (ref 4.2–5.4)
SODIUM SERPL-SCNC: 135 MMOL/L (ref 136–145)
WBC # BLD AUTO: 8.7 THOU/UL (ref 4.8–10.8)

## 2021-01-25 RX ADMIN — ASPIRIN SCH MG: 81 TABLET ORAL at 08:37

## 2021-01-25 NOTE — PRG
DATE OF SERVICE:  01/25/2021



SUBJECTIVE:  Ms. Jensen is up in her chair.  She denies any complaints.  She is

happy with her progress.  Discussed with therapy and they feel like the patient will

benefit from at least a few more days and they think the early __________ Friday.

__________ was not happy, but she is agreeable.  She states that her respirations

are pretty much back to her baseline. 



OBJECTIVE:  VITAL SIGNS:  She is afebrile, heart rate 79, respirations 18, oxygen

saturation 92% on 3 L, blood pressure is 187/81.  They did not recheck it after they

gave her morning medications.  I did advise nurses to check it now and call me if it

is more than 140/90. 

CARDIOVASCULAR SYSTEM:  S1-S2 plus. 

RESPIRATORY SYSTEM:  Normal vesicular breath sounds. 

ABDOMEN:  Soft and nontender.  Bowel sounds heard in all quadrants. 

EXTREMITIES:  Without cyanosis or clubbing. 

CENTRAL NERVOUS SYSTEM:  Improving deconditioning.



IMPRESSION:  

1. Chronic obstructive pulmonary disease with resolved exacerbation.

2. Resolved acute on chronic hypoxemic respiratory failure.  She is now back to her

baseline level of 3 L of oxygen via nasal cannula. 

3. Recent COVID-19 infection.

4. Hypertension.

5. Dyslipidemia.

6. Hypothyroidism.

7. Anxiety and depression.



PLAN:  

1. Continue current medications.

2. Heart healthy diet.

3. Monitor respiratory status.

4. DVT prophylaxis with PlexiPulses.

5. Decubitus precautions.

6. Stress ulcer prophylaxis.

7. Reduce Decadron to 2 mg for 3 more days, then stop.

8. Discontinue doxycycline as she has finished her 14 days.

9. Continue therapy.







Job ID:  [id number]

## 2021-01-26 RX ADMIN — ASPIRIN SCH MG: 81 TABLET ORAL at 09:10

## 2021-01-27 RX ADMIN — ASPIRIN SCH MG: 81 TABLET ORAL at 09:17

## 2021-01-27 NOTE — PRG
DATE OF SERVICE:  01/26/2021



SUBJECTIVE:  Ms. Jensen is up in her chair.  She just finished lunch.  Her daughter

is in the room.  She apparently had a good therapy session and she walked 120 feet.

The patient states that she does need a new prescription for a nebulizer as her

current one is not working.  They use [employer] .  Discussed with daughter and all

questions answered. 



OBJECTIVE:  VITAL SIGNS:  She is afebrile.  Heart rate is 77, respirations 18,

oxygen saturation 96% on 3 L, blood pressure 132/71. 

CARDIOVASCULAR: S1, S2 plus. 

RESPIRATORY: Normal vesicular breath sounds with scattered rhonchi. 

ABDOMEN: Soft, nontender. Bowel sounds heard in all quadrants. 

EXTREMITIES: Without cyanosis or clubbing. 

CENTRAL NERVOUS SYSTEM: Improving deconditioning.



IMPRESSION:  

1. Recent COVID-19 infection.

2. Chronic obstructive pulmonary disease.

3. Chronic hypoxemic respiratory failure.

4. Hypertension.

5. Dyslipidemia.

6. Hypothyroidism.

7. Depression and anxiety.

8. Deconditioning.



PLAN:  

1. Continue current medications.

2. Heart-healthy diet.

3. Monitor respiratory status.

4. Physical therapy.

5. Discharge planning.

6. Write an order for nebulizer to be delivered to her home.

7. Discussed with the patient and daughter in detail.  All questions answered.







Job ID:  [id number]

## 2021-01-27 NOTE — PRG
DATE OF SERVICE:  01/27/2021



SUBJECTIVE:  Ms. Jensne is up in her bed.  She states that she walked all the way

around the nurse's station today.  Therapy is recommending a four-wheeled walker.

They are anticipating her to be ready for discharge early next week, but the patient

is really wanting to go home this Friday.  I will talk to therapy tomorrow. 



OBJECTIVE:  VITAL SIGNS:  She is afebrile, heart rate 84, respirations 20, oxygen

saturation 94% on 3 L, blood pressure was 130/61, but with therapy, it was 170/96.

Did increase her lisinopril from 10 to 20 mg this morning. 

CARDIOVASCULAR SYSTEM:  S1 and S2 plus. 

RESPIRATORY SYSTEM:  Normal vesicular breath sounds.  Occasional rhonchi. 

ABDOMEN:  Soft, nontender.  Bowel sounds heard in all quadrants. 

EXTREMITIES:  Without cyanosis or clubbing. 

CENTRAL NERVOUS SYSTEM:  Generalized weakness.  Otherwise, nonfocal, improving

deconditioning. 



IMPRESSION:  

1. Chronic obstructive pulmonary disease.

2. Chronic hypoxemic respiratory failure.

3. Recent COVID-19 infection.

4. Hypertension.

5. Dyslipidemia.

6. Hypothyroidism.

7. Depression and anxiety.

8. Improving deconditioning.



PLAN:  

1. Continue current medications, but increase lisinopril to 20 mg in the morning.

2. Heart-healthy diet.

3. Monitor blood pressure and adjust medications.

4. Monitor respiratory status and breathing treatments as needed.

5. Physical therapy.

6. Routine laboratory values.

7. Discharge planning.

8. Order her rolling walker.







Job ID:  [id number]

## 2021-01-28 RX ADMIN — ASPIRIN SCH MG: 81 TABLET ORAL at 09:34

## 2021-01-28 NOTE — PRG
DATE OF SERVICE:  01/28/2021



SUBJECTIVE:  Ms. Jensen is doing well.  She walked about 230 feet.  Discussed with

PT and OT, both feel that she is safe to go home tomorrow.  I spoke with her

daughter.  Daughter has ordered her four-wheeled walker already.  She will come by

and pick her up about 1 o'clock tomorrow.  She would like home health and the

patient states she wants to use the same home health she used in the past and she

thinks it was [facility], I am going to contact them and hopefully they can admit her

over the weekend.  I am going to review her medicines, but she is not on anything

new other than her lisinopril dose increased and also in the prescription. 



OBJECTIVE:  VITAL SIGNS:  She is afebrile, heart rate 77, respirations 18, oxygen

saturation 95% on 3 L nasal cannula, and blood pressure 161/70. 

CARDIOVASCULAR SYSTEM:  S1 and S2 plus.  Rate and rhythm regular. 

RESPIRATORY SYSTEM:  Normal vesicular breath sounds. 

ABDOMEN:  Soft and nontender.  Bowel sounds heard in all quadrants. 

EXTREMITIES:  Without cyanosis or clubbing.



IMPRESSION:  

1. Chronic obstructive pulmonary disease.

2. Chronic hypoxemic respiratory failure.

3. Recent COVID-19 infection.

4. Hypertension.

5. Dyslipidemia.

6. Deconditioning.



PLAN:  

1. Continue current medications.

2. Discharge planning.

3. Arrange home health.

4. Continue therapy.

5. Discussed with the patient and daughter in detail.  All questions answered.





Job ID:  [id number]

## 2021-01-29 VITALS — DIASTOLIC BLOOD PRESSURE: 72 MMHG | SYSTOLIC BLOOD PRESSURE: 169 MMHG | TEMPERATURE: 95.6 F

## 2021-01-29 RX ADMIN — ASPIRIN SCH MG: 81 TABLET ORAL at 07:58

## 2021-06-28 ENCOUNTER — HOSPITAL ENCOUNTER (EMERGENCY)
Dept: HOSPITAL 18 - NAV ERS | Age: 86
LOS: 1 days | Discharge: HOME | End: 2021-06-29
Payer: MEDICARE

## 2021-06-28 DIAGNOSIS — Z79.899: ICD-10-CM

## 2021-06-28 DIAGNOSIS — I10: ICD-10-CM

## 2021-06-28 DIAGNOSIS — J44.1: Primary | ICD-10-CM

## 2021-06-28 DIAGNOSIS — Z87.891: ICD-10-CM

## 2021-06-28 DIAGNOSIS — E03.9: ICD-10-CM

## 2021-06-28 DIAGNOSIS — Z79.82: ICD-10-CM

## 2021-06-28 DIAGNOSIS — Z79.52: ICD-10-CM

## 2021-06-28 LAB
ALBUMIN SERPL BCG-MCNC: 3.8 G/DL (ref 3.4–4.8)
ALP SERPL-CCNC: 83 U/L (ref 40–110)
ALT SERPL W P-5'-P-CCNC: 13 U/L (ref 8–55)
ANION GAP SERPL CALC-SCNC: 15 MMOL/L (ref 10–20)
AST SERPL-CCNC: 13 U/L (ref 5–34)
BASOPHILS # BLD AUTO: 0.1 THOU/UL (ref 0–0.2)
BASOPHILS NFR BLD AUTO: 0.9 % (ref 0–1)
BILIRUB SERPL-MCNC: 0.5 MG/DL (ref 0.2–1.2)
BUN SERPL-MCNC: 11 MG/DL (ref 9.8–20.1)
CALCIUM SERPL-MCNC: 8.8 MG/DL (ref 7.8–10.44)
CHLORIDE SERPL-SCNC: 104 MMOL/L (ref 98–107)
CK SERPL-CCNC: 48 U/L (ref 29–168)
CO2 SERPL-SCNC: 23 MMOL/L (ref 23–31)
CREAT CL PREDICTED SERPL C-G-VRATE: 0 ML/MIN (ref 70–130)
EOSINOPHIL # BLD AUTO: 0.2 THOU/UL (ref 0–0.7)
EOSINOPHIL NFR BLD AUTO: 1.3 % (ref 0–10)
GLOBULIN SER CALC-MCNC: 3.5 G/DL (ref 2.4–3.5)
GLUCOSE SERPL-MCNC: 121 MG/DL (ref 83–110)
HGB BLD-MCNC: 12.7 G/DL (ref 12–16)
LYMPHOCYTES # BLD AUTO: 2 THOU/UL (ref 1.2–3.4)
LYMPHOCYTES NFR BLD AUTO: 14.5 % (ref 21–51)
MCH RBC QN AUTO: 26.7 PG (ref 27–31)
MCV RBC AUTO: 87 FL (ref 78–98)
MONOCYTES # BLD AUTO: 0.7 THOU/UL (ref 0.11–0.59)
MONOCYTES NFR BLD AUTO: 4.9 % (ref 0–10)
NEUTROPHILS # BLD AUTO: 10.8 THOU/UL (ref 1.4–6.5)
NEUTROPHILS NFR BLD AUTO: 78.4 % (ref 42–75)
PLATELET # BLD AUTO: 254 THOU/UL (ref 130–400)
POTASSIUM SERPL-SCNC: 4.1 MMOL/L (ref 3.5–5.1)
RBC # BLD AUTO: 4.76 MILL/UL (ref 4.2–5.4)
SODIUM SERPL-SCNC: 138 MMOL/L (ref 136–145)
WBC # BLD AUTO: 13.7 THOU/UL (ref 4.8–10.8)

## 2021-06-28 PROCEDURE — 80053 COMPREHEN METABOLIC PANEL: CPT

## 2021-06-28 PROCEDURE — 87807 RSV ASSAY W/OPTIC: CPT

## 2021-06-28 PROCEDURE — 84484 ASSAY OF TROPONIN QUANT: CPT

## 2021-06-28 PROCEDURE — 94640 AIRWAY INHALATION TREATMENT: CPT

## 2021-06-28 PROCEDURE — 71045 X-RAY EXAM CHEST 1 VIEW: CPT

## 2021-06-28 PROCEDURE — 96365 THER/PROPH/DIAG IV INF INIT: CPT

## 2021-06-28 PROCEDURE — 85025 COMPLETE CBC W/AUTO DIFF WBC: CPT

## 2021-06-28 PROCEDURE — 82550 ASSAY OF CK (CPK): CPT

## 2021-06-28 PROCEDURE — 83605 ASSAY OF LACTIC ACID: CPT

## 2021-06-28 PROCEDURE — 93005 ELECTROCARDIOGRAM TRACING: CPT

## 2021-06-28 PROCEDURE — 96375 TX/PRO/DX INJ NEW DRUG ADDON: CPT

## 2021-06-28 PROCEDURE — 87040 BLOOD CULTURE FOR BACTERIA: CPT

## 2022-05-26 ENCOUNTER — HOSPITAL ENCOUNTER (OUTPATIENT)
Dept: HOSPITAL 18 - NAV RAD | Age: 87
Discharge: HOME | End: 2022-05-26
Attending: STUDENT IN AN ORGANIZED HEALTH CARE EDUCATION/TRAINING PROGRAM
Payer: MEDICARE

## 2022-05-26 DIAGNOSIS — R06.02: Primary | ICD-10-CM

## 2022-05-26 PROCEDURE — 71046 X-RAY EXAM CHEST 2 VIEWS: CPT

## 2024-02-12 NOTE — DIS
DATE OF ADMISSION:  01/20/2021



DATE OF DISCHARGE:  01/29/2021



PRINCIPAL DIAGNOSES:  

1. Recent COVID-19 infection.

2. Hypoxemia, which resolved with placing her on oxygen here in the hospital.

3. Resolved acute on chronic hypoxemic respiratory failure.

4. Chronic hypoxemic respiratory failure.

5. Possible chronic obstructive pulmonary disease exacerbation, which has resolved.

6. Coronary artery disease.

7. Hypertension.

8. Dyslipidemia.

9. Hypothyroidism.

10. Chronic obstructive pulmonary disease.

11. Osteoarthritis.

12. Depression, anxiety.

13. Deconditioning.



COMPLICATIONS:  None.



ADVERSE REACTIONS:  None.



PROCEDURES:  None.



CONSULTATIONS:  None.



HOSPITAL COURSE:  The patient was admitted with hypoxemia, but when she came to the

hospital, when she was placed on oxygen, her oxygen levels came back up to her

baseline which is 93% to 97%.  It was felt that she may have a malfunction of her

oxygen equipment at home, so she was admitted to the hospital for monitoring while

that can be checked out.  Apparently, the problem was that she had a 125-foot hose

attached to her concentrator where it should be only a 25-foot hose.  While in the

hospital, she did develop some worsening shortness of breath where her oxygen had to

be bumped up to 4 L.  I placed her back on her DuoNeb via nebulizer and she was

continued on her dexamethasone and doxycycline as she recently was treated for

COVID-19 infection.  She slowly responded.  She was tapered back to her baseline

level of 3 L of oxygen.  She also participated with therapy and eventually was able

to walk about 230 feet.  She was deemed stable for discharge to home.  The only

thing therapy recommended was a rolling walker.  Daughter has made arrangements for

her 25-foot hose.  The patient was deemed stable to discharge home.  Orders were

also written for a nebulizer that the patient needed.  Her blood pressure medicine

was increased from 10 mg to 20 mg of lisinopril and that prescription was also sent

to [facility].  She is to continue on her Trelegy. 



PHYSICAL EXAMINATION:

VITAL SIGNS:  On the day of discharge, the patient is afebrile.  Heart rate is 80,

respirations 20, oxygen saturation was 92% on 2.5 L, blood pressure 169/72. 

CARDIOVASCULAR SYSTEM:  S1, S2 plus. 

RESPIRATORY SYSTEM:  Normal vesicular breath sounds. 

ABDOMEN:  Soft, nontender.  Bowel sounds heard in all quadrants. 

EXTREMITIES:  Without cyanosis or clubbing. 

CENTRAL NERVOUS SYSTEM:  Nonfocal.



DISCHARGE MEDICATIONS:  

1. Amlodipine.

2. Atorvastatin, which is Caduet 10/80 one tablet at bedtime.

3. Aspirin 81 mg daily.

4. Atenolol 25 mg daily.

5. Trelegy Ellipta one inhalation daily.  She is to gargle after use.

6. DuoNeb 3 mL q.4 p.r.n.

7. Levothyroxine 100 mcg daily.

8. Lisinopril 20 mg in the morning.

9. MiraLAX 17 g in 8 ounces of water daily.



DISCHARGE INSTRUCTIONS:  Heart-healthy diet.  Activity as tolerated.  Continue using

oxygen at all times.  Home health has been arranged with the [facility] for

therapy.  The patient does qualify for home health as she is homebound and she does

require therapy.  She also needs skilled nursing visit to monitor her oxygen status.

 Please consider this note as a face-to-face documentation.  The patient is to 

follow up in my office in two weeks.  She is to call us with any questions or

concerns.  For full details, please see chart. 



TIME SPENT:  Total time spent on this discharge including coordination of care was

37 minutes. 







Job ID:  [id number] If you are a smoker, it is important for your health to stop smoking. Please be aware that second hand smoke is also harmful.